# Patient Record
Sex: FEMALE | Race: WHITE | Employment: FULL TIME | URBAN - METROPOLITAN AREA
[De-identification: names, ages, dates, MRNs, and addresses within clinical notes are randomized per-mention and may not be internally consistent; named-entity substitution may affect disease eponyms.]

---

## 2018-10-17 LAB
ALBUMIN SERPL-MCNC: 4.6 G/DL
ALP BLD-CCNC: 46 U/L
ALT SERPL-CCNC: 11 U/L
ANION GAP SERPL CALCULATED.3IONS-SCNC: NORMAL MMOL/L
AST SERPL-CCNC: 12 U/L
BILIRUB SERPL-MCNC: 0.5 MG/DL (ref 0.1–1.4)
BUN BLDV-MCNC: NORMAL MG/DL
CALCIUM SERPL-MCNC: 9.2 MG/DL
CHLORIDE BLD-SCNC: 105 MMOL/L
CHOLESTEROL, TOTAL: 186 MG/DL
CHOLESTEROL/HDL RATIO: NORMAL
CO2: 25 MMOL/L
CREAT SERPL-MCNC: 0.73 MG/DL
GFR CALCULATED: NORMAL
GLUCOSE BLD-MCNC: 89 MG/DL
HDLC SERPL-MCNC: 56 MG/DL (ref 35–70)
LDL CHOLESTEROL CALCULATED: NORMAL MG/DL (ref 0–160)
LDL CHOLESTEROL DIRECT: 114 MG/DL
POTASSIUM SERPL-SCNC: 4.3 MMOL/L
SODIUM BLD-SCNC: 140 MMOL/L
TOTAL PROTEIN: 6.8
TRIGL SERPL-MCNC: 70 MG/DL
VLDLC SERPL CALC-MCNC: NORMAL MG/DL

## 2019-01-08 ENCOUNTER — OFFICE VISIT (OUTPATIENT)
Dept: FAMILY MEDICINE CLINIC | Age: 39
End: 2019-01-08
Payer: COMMERCIAL

## 2019-01-08 VITALS
TEMPERATURE: 96.1 F | HEART RATE: 68 BPM | OXYGEN SATURATION: 98 % | RESPIRATION RATE: 16 BRPM | HEIGHT: 66 IN | DIASTOLIC BLOOD PRESSURE: 58 MMHG | SYSTOLIC BLOOD PRESSURE: 102 MMHG | BODY MASS INDEX: 38.89 KG/M2 | WEIGHT: 242 LBS

## 2019-01-08 DIAGNOSIS — B00.1 COLD SORE: ICD-10-CM

## 2019-01-08 DIAGNOSIS — G47.9 SLEEP DISTURBANCE: ICD-10-CM

## 2019-01-08 DIAGNOSIS — L30.9 DERMATITIS: ICD-10-CM

## 2019-01-08 DIAGNOSIS — R06.83 SNORING: ICD-10-CM

## 2019-01-08 DIAGNOSIS — R25.1 TREMORS OF NERVOUS SYSTEM: ICD-10-CM

## 2019-01-08 DIAGNOSIS — E03.9 ACQUIRED HYPOTHYROIDISM: Primary | ICD-10-CM

## 2019-01-08 DIAGNOSIS — M62.838 MUSCLE SPASM: ICD-10-CM

## 2019-01-08 PROCEDURE — 99203 OFFICE O/P NEW LOW 30 MIN: CPT | Performed by: INTERNAL MEDICINE

## 2019-01-08 RX ORDER — VALACYCLOVIR HYDROCHLORIDE 1 G/1
TABLET, FILM COATED ORAL
COMMUNITY
Start: 2018-10-12 | End: 2019-09-17 | Stop reason: SDUPTHER

## 2019-01-08 RX ORDER — LEVOTHYROXINE SODIUM 88 MCG
TABLET ORAL
COMMUNITY
Start: 2018-12-18

## 2019-01-08 RX ORDER — TRIAMCINOLONE ACETONIDE 1 MG/ML
LOTION TOPICAL 3 TIMES DAILY
COMMUNITY

## 2019-01-08 RX ORDER — CYCLOBENZAPRINE HCL 5 MG
TABLET ORAL
COMMUNITY
Start: 2018-10-17 | End: 2019-09-17 | Stop reason: SDUPTHER

## 2019-01-08 ASSESSMENT — ENCOUNTER SYMPTOMS
COUGH: 0
STRIDOR: 0
BACK PAIN: 0
SHORTNESS OF BREATH: 0
CHEST TIGHTNESS: 0
CONSTIPATION: 0
DIARRHEA: 0
CHOKING: 0
ABDOMINAL PAIN: 0
WHEEZING: 0

## 2019-01-08 ASSESSMENT — PATIENT HEALTH QUESTIONNAIRE - PHQ9
SUM OF ALL RESPONSES TO PHQ9 QUESTIONS 1 & 2: 0
SUM OF ALL RESPONSES TO PHQ QUESTIONS 1-9: 0
SUM OF ALL RESPONSES TO PHQ QUESTIONS 1-9: 0
2. FEELING DOWN, DEPRESSED OR HOPELESS: 0
1. LITTLE INTEREST OR PLEASURE IN DOING THINGS: 0

## 2019-09-17 RX ORDER — VALACYCLOVIR HYDROCHLORIDE 1 G/1
1000 TABLET, FILM COATED ORAL DAILY
Qty: 30 TABLET | Refills: 5 | Status: SHIPPED | OUTPATIENT
Start: 2019-09-17 | End: 2020-10-21 | Stop reason: SDUPTHER

## 2019-09-17 RX ORDER — CYCLOBENZAPRINE HCL 5 MG
5 TABLET ORAL 3 TIMES DAILY PRN
Qty: 30 TABLET | Refills: 5 | Status: SHIPPED | OUTPATIENT
Start: 2019-09-17 | End: 2020-05-11 | Stop reason: ALTCHOICE

## 2020-05-11 ENCOUNTER — OFFICE VISIT (OUTPATIENT)
Dept: FAMILY MEDICINE CLINIC | Age: 40
End: 2020-05-11
Payer: COMMERCIAL

## 2020-05-11 VITALS
WEIGHT: 263 LBS | RESPIRATION RATE: 16 BRPM | HEIGHT: 67 IN | TEMPERATURE: 96.5 F | BODY MASS INDEX: 41.28 KG/M2 | HEART RATE: 82 BPM | SYSTOLIC BLOOD PRESSURE: 102 MMHG | OXYGEN SATURATION: 97 % | DIASTOLIC BLOOD PRESSURE: 66 MMHG

## 2020-05-11 PROCEDURE — 99213 OFFICE O/P EST LOW 20 MIN: CPT | Performed by: INTERNAL MEDICINE

## 2020-05-11 PROCEDURE — 99396 PREV VISIT EST AGE 40-64: CPT | Performed by: INTERNAL MEDICINE

## 2020-05-11 RX ORDER — DEXAMETHASONE 4 MG/1
4 TABLET ORAL ONCE
Qty: 1 TABLET | Refills: 0 | Status: SHIPPED | OUTPATIENT
Start: 2020-05-11 | End: 2020-05-11

## 2020-05-11 ASSESSMENT — ENCOUNTER SYMPTOMS
ABDOMINAL PAIN: 0
DIARRHEA: 0
BOWEL INCONTINENCE: 0
COUGH: 0
VOMITING: 0
BLOOD IN STOOL: 0
CHOKING: 0
BACK PAIN: 0
CONSTIPATION: 0
WHEEZING: 0
ANAL BLEEDING: 0
CHEST TIGHTNESS: 0
STRIDOR: 0
NAUSEA: 0
SHORTNESS OF BREATH: 0

## 2020-05-11 ASSESSMENT — PATIENT HEALTH QUESTIONNAIRE - PHQ9
1. LITTLE INTEREST OR PLEASURE IN DOING THINGS: 0
SUM OF ALL RESPONSES TO PHQ QUESTIONS 1-9: 0
SUM OF ALL RESPONSES TO PHQ QUESTIONS 1-9: 0
SUM OF ALL RESPONSES TO PHQ9 QUESTIONS 1 & 2: 0
2. FEELING DOWN, DEPRESSED OR HOPELESS: 0

## 2020-05-11 NOTE — PROGRESS NOTES
Visit Information    Have you changed or started any medications since your last visit including any over-the-counter medicines, vitamins, or herbal medicines? no   Are you having any side effects from any of your medications? -  no  Have you stopped taking any of your medications? Is so, why? -  no    Have you seen any other physician or provider since your last visit? No  Have you had any other diagnostic tests since your last visit? No  Have you been seen in the emergency room and/or had an admission to a hospital since we last saw you? No  Have you had your routine dental cleaning in the past 6 months? no    Have you activated your [employer] account? If not, what are your barriers?  Yes     Patient Care Team:  Hay Mercado DO as PCP - General (Family Medicine)  Hay Mercado DO as PCP - [facility] Provider    Medical History Review  Past Medical, Family, and Social History reviewed and does contribute to the patient presenting condition    Health Maintenance   Topic Date Due    HIV screen  09/01/1994    Cervical cancer screen  09/01/2000    Flu vaccine (Season Ended) 09/01/2020    DTaP/Tdap/Td vaccine (2 - Td) 01/08/2023    Lipid screen  10/17/2023    Hepatitis A vaccine  Aged Out    Hepatitis B vaccine  Aged Out    Hib vaccine  Aged Out    Meningococcal (ACWY) vaccine  Aged Out    Pneumococcal 0-64 years Vaccine  Aged Out

## 2020-05-19 ENCOUNTER — TELEPHONE (OUTPATIENT)
Dept: FAMILY MEDICINE CLINIC | Age: 40
End: 2020-05-19

## 2020-05-21 ENCOUNTER — HOSPITAL ENCOUNTER (OUTPATIENT)
Age: 40
Setting detail: SPECIMEN
Discharge: HOME | End: 2020-05-21
Payer: COMMERCIAL

## 2020-05-21 ENCOUNTER — HOSPITAL ENCOUNTER (OUTPATIENT)
Age: 40
Discharge: HOME | End: 2020-05-21
Payer: COMMERCIAL

## 2020-05-21 LAB
ABSOLUTE EOS #: <0.03 K/UL (ref 0–0.44)
ABSOLUTE IMMATURE GRANULOCYTE: 0.04 K/UL (ref 0–0.3)
ABSOLUTE LYMPH #: 0.85 K/UL (ref 1.1–3.7)
ABSOLUTE MONO #: 0.14 K/UL (ref 0.1–1.2)
ALBUMIN SERPL-MCNC: 4.6 G/DL (ref 3.5–5.2)
ALBUMIN/GLOBULIN RATIO: 1.6 (ref 1–2.5)
ALP BLD-CCNC: 75 U/L (ref 35–104)
ALT SERPL-CCNC: 25 U/L (ref 5–33)
ANION GAP SERPL CALCULATED.3IONS-SCNC: 17 MMOL/L (ref 9–17)
AST SERPL-CCNC: 17 U/L
BASOPHILS # BLD: 0 % (ref 0–2)
BASOPHILS ABSOLUTE: <0.03 K/UL (ref 0–0.2)
BILIRUB SERPL-MCNC: 0.37 MG/DL (ref 0.3–1.2)
BUN BLDV-MCNC: 13 MG/DL (ref 6–20)
BUN/CREAT BLD: ABNORMAL (ref 9–20)
CALCIUM SERPL-MCNC: 9.9 MG/DL (ref 8.6–10.4)
CHLORIDE BLD-SCNC: 99 MMOL/L (ref 98–107)
CO2: 20 MMOL/L (ref 20–31)
CORTISOL COLLECTION INFO: ABNORMAL
CORTISOL: 0.4 UG/DL (ref 2.7–18.4)
CREAT SERPL-MCNC: 0.53 MG/DL (ref 0.5–0.9)
DIFFERENTIAL TYPE: ABNORMAL
EOSINOPHILS RELATIVE PERCENT: 0 % (ref 1–4)
GFR AFRICAN AMERICAN: >60 ML/MIN
GFR NON-AFRICAN AMERICAN: >60 ML/MIN
GFR SERPL CREATININE-BSD FRML MDRD: ABNORMAL ML/MIN/{1.73_M2}
GFR SERPL CREATININE-BSD FRML MDRD: ABNORMAL ML/MIN/{1.73_M2}
GLUCOSE BLD-MCNC: 112 MG/DL (ref 70–99)
HCT VFR BLD CALC: 46.9 % (ref 36.3–47.1)
HEMOGLOBIN: 15.7 G/DL (ref 11.9–15.1)
IMMATURE GRANULOCYTES: 0 %
LYMPHOCYTES # BLD: 9 % (ref 24–43)
MCH RBC QN AUTO: 28 PG (ref 25.2–33.5)
MCHC RBC AUTO-ENTMCNC: 33.5 G/DL (ref 28.4–34.8)
MCV RBC AUTO: 83.6 FL (ref 82.6–102.9)
MONOCYTES # BLD: 2 % (ref 3–12)
NRBC AUTOMATED: 0 PER 100 WBC
PDW BLD-RTO: 13 % (ref 11.8–14.4)
PLATELET # BLD: 271 K/UL (ref 138–453)
PLATELET ESTIMATE: ABNORMAL
PMV BLD AUTO: 9.9 FL (ref 8.1–13.5)
POTASSIUM SERPL-SCNC: 4.3 MMOL/L (ref 3.7–5.3)
RBC # BLD: 5.61 M/UL (ref 3.95–5.11)
RBC # BLD: ABNORMAL 10*6/UL
SEG NEUTROPHILS: 89 % (ref 36–65)
SEGMENTED NEUTROPHILS ABSOLUTE COUNT: 8.22 K/UL (ref 1.5–8.1)
SODIUM BLD-SCNC: 136 MMOL/L (ref 135–144)
THYROGLOBULIN AB: <20 IU/ML (ref 0–40)
THYROID PEROXIDASE (TPO) AB: 196 IU/ML (ref 0–35)
TOTAL PROTEIN: 7.5 G/DL (ref 6.4–8.3)
TSH SERPL DL<=0.05 MIU/L-ACNC: 1.86 MIU/L (ref 0.3–5)
WBC # BLD: 9.3 K/UL (ref 3.5–11.3)
WBC # BLD: ABNORMAL 10*3/UL

## 2020-05-21 PROCEDURE — 82533 TOTAL CORTISOL: CPT

## 2020-05-21 PROCEDURE — 82024 ASSAY OF ACTH: CPT

## 2020-05-21 PROCEDURE — 36415 COLL VENOUS BLD VENIPUNCTURE: CPT

## 2020-05-22 LAB — ADRENOCORTICOTROPIC HORMONE: <5 PG/ML (ref 6–58)

## 2020-06-01 ENCOUNTER — HOSPITAL ENCOUNTER (OUTPATIENT)
Dept: SLEEP CENTER | Age: 40
End: 2020-06-01
Payer: COMMERCIAL

## 2020-06-03 ENCOUNTER — HOSPITAL ENCOUNTER (OUTPATIENT)
Age: 40
Discharge: HOME | End: 2020-06-03
Payer: COMMERCIAL

## 2020-06-03 LAB
ADRENOCORTICOTROPIC HORMONE: 16 PG/ML (ref 6–58)
ALBUMIN SERPL-MCNC: 3.9 G/DL (ref 3.5–5.2)
ALBUMIN/GLOBULIN RATIO: 1.5 (ref 1–2.5)
ALP BLD-CCNC: 63 U/L (ref 35–104)
ALT SERPL-CCNC: 18 U/L (ref 5–33)
ANION GAP SERPL CALCULATED.3IONS-SCNC: 13 MMOL/L (ref 9–17)
AST SERPL-CCNC: 16 U/L
BILIRUB SERPL-MCNC: 0.41 MG/DL (ref 0.3–1.2)
BUN BLDV-MCNC: 13 MG/DL (ref 6–20)
BUN/CREAT BLD: ABNORMAL (ref 9–20)
CALCIUM SERPL-MCNC: 9.2 MG/DL (ref 8.6–10.4)
CHLORIDE BLD-SCNC: 103 MMOL/L (ref 98–107)
CO2: 19 MMOL/L (ref 20–31)
CREAT SERPL-MCNC: 0.6 MG/DL (ref 0.5–0.9)
DHEAS (DHEA SULFATE): 85.2 UG/DL (ref 32–240)
GFR AFRICAN AMERICAN: >60 ML/MIN
GFR NON-AFRICAN AMERICAN: >60 ML/MIN
GFR SERPL CREATININE-BSD FRML MDRD: ABNORMAL ML/MIN/{1.73_M2}
GFR SERPL CREATININE-BSD FRML MDRD: ABNORMAL ML/MIN/{1.73_M2}
GLUCOSE FASTING: 94 MG/DL (ref 70–99)
INSULIN COMMENT: NORMAL
INSULIN REFERENCE RANGE:: NORMAL
INSULIN: 23 MU/L
IRON SATURATION: 27 % (ref 20–55)
IRON: 66 UG/DL (ref 37–145)
POTASSIUM SERPL-SCNC: 4.2 MMOL/L (ref 3.7–5.3)
SODIUM BLD-SCNC: 135 MMOL/L (ref 135–144)
T3 FREE: 2.92 PG/ML (ref 2.02–4.43)
TESTOSTERONE TOTAL: 11 NG/DL (ref 20–70)
THYROXINE, FREE: 1.13 NG/DL (ref 0.93–1.7)
TOTAL IRON BINDING CAPACITY: 248 UG/DL (ref 250–450)
TOTAL PROTEIN: 6.5 G/DL (ref 6.4–8.3)
TSH SERPL DL<=0.05 MIU/L-ACNC: 4.24 MIU/L (ref 0.3–5)
UNSATURATED IRON BINDING CAPACITY: 182 UG/DL (ref 112–347)
VITAMIN D 25-HYDROXY: 19 NG/ML (ref 30–100)

## 2020-06-03 PROCEDURE — 82627 DEHYDROEPIANDROSTERONE: CPT

## 2020-06-03 PROCEDURE — 84244 ASSAY OF RENIN: CPT

## 2020-06-03 PROCEDURE — 84443 ASSAY THYROID STIM HORMONE: CPT

## 2020-06-03 PROCEDURE — 83550 IRON BINDING TEST: CPT

## 2020-06-03 PROCEDURE — 82306 VITAMIN D 25 HYDROXY: CPT

## 2020-06-03 PROCEDURE — 36415 COLL VENOUS BLD VENIPUNCTURE: CPT

## 2020-06-03 PROCEDURE — 82024 ASSAY OF ACTH: CPT

## 2020-06-03 PROCEDURE — 82088 ASSAY OF ALDOSTERONE: CPT

## 2020-06-03 PROCEDURE — 83525 ASSAY OF INSULIN: CPT

## 2020-06-03 PROCEDURE — 83498 ASY HYDROXYPROGESTERONE 17-D: CPT

## 2020-06-03 PROCEDURE — 84403 ASSAY OF TOTAL TESTOSTERONE: CPT

## 2020-06-03 PROCEDURE — 80053 COMPREHEN METABOLIC PANEL: CPT

## 2020-06-03 PROCEDURE — 84439 ASSAY OF FREE THYROXINE: CPT

## 2020-06-03 PROCEDURE — 84481 FREE ASSAY (FT-3): CPT

## 2020-06-03 PROCEDURE — 83540 ASSAY OF IRON: CPT

## 2020-06-05 LAB
ALDOSTERONE COMMENT: NORMAL
ALDOSTERONE: 21.2 NG/DL

## 2020-06-06 LAB
17 OH PROGESTERONE: 244.82 NG/DL
RENIN ACTIVITY: 2.8 NG/ML/HR
RENIN COMMENT: NORMAL

## 2020-06-09 ENCOUNTER — HOSPITAL ENCOUNTER (OUTPATIENT)
Dept: SLEEP CENTER | Age: 40
Discharge: HOME | End: 2020-06-11
Payer: COMMERCIAL

## 2020-06-09 PROCEDURE — G0399 HOME SLEEP TEST/TYPE 3 PORTA: HCPCS

## 2020-06-13 ENCOUNTER — HOSPITAL ENCOUNTER (OUTPATIENT)
Age: 40
Setting detail: SPECIMEN
Discharge: HOME | End: 2020-06-13
Payer: COMMERCIAL

## 2020-06-13 PROCEDURE — 82533 TOTAL CORTISOL: CPT

## 2020-06-19 LAB — CORTISOL SALIVARY: 0.05 UG/DL

## 2020-06-28 LAB — STATUS: NORMAL

## 2020-07-10 ENCOUNTER — HOSPITAL ENCOUNTER (OUTPATIENT)
Age: 40
Discharge: HOME | End: 2020-07-10
Payer: COMMERCIAL

## 2020-07-10 LAB — VITAMIN D 25-HYDROXY: 34.1 NG/ML (ref 30–100)

## 2020-07-10 PROCEDURE — 36415 COLL VENOUS BLD VENIPUNCTURE: CPT

## 2020-07-10 PROCEDURE — 82306 VITAMIN D 25 HYDROXY: CPT

## 2020-08-07 PROBLEM — R63.5 ABNORMAL WEIGHT GAIN: Status: ACTIVE | Noted: 2020-08-07

## 2020-08-07 PROBLEM — G93.32 CHRONIC FATIGUE SYNDROME: Status: ACTIVE | Noted: 2020-08-07

## 2020-08-14 ENCOUNTER — HOSPITAL ENCOUNTER (OUTPATIENT)
Facility: MEDICAL CENTER | Age: 40
End: 2020-08-14
Payer: COMMERCIAL

## 2020-08-19 ENCOUNTER — HOSPITAL ENCOUNTER (OUTPATIENT)
Dept: INFUSION THERAPY | Facility: MEDICAL CENTER | Age: 40
Discharge: HOME | End: 2020-08-19
Payer: COMMERCIAL

## 2020-08-19 VITALS
DIASTOLIC BLOOD PRESSURE: 83 MMHG | TEMPERATURE: 98.2 F | RESPIRATION RATE: 16 BRPM | SYSTOLIC BLOOD PRESSURE: 128 MMHG | HEART RATE: 79 BPM

## 2020-08-19 DIAGNOSIS — G93.32 CHRONIC FATIGUE SYNDROME: Primary | ICD-10-CM

## 2020-08-19 DIAGNOSIS — R63.5 ABNORMAL WEIGHT GAIN: ICD-10-CM

## 2020-08-19 LAB
CORTISOL COLLECTION INFO: NORMAL
CORTISOL: 16.3 UG/DL (ref 2.7–18.4)

## 2020-08-19 PROCEDURE — 96375 TX/PRO/DX INJ NEW DRUG ADDON: CPT

## 2020-08-19 PROCEDURE — 82533 TOTAL CORTISOL: CPT

## 2020-08-19 PROCEDURE — 6360000002 HC RX W HCPCS: Performed by: INTERNAL MEDICINE

## 2020-08-19 PROCEDURE — 2580000003 HC RX 258: Performed by: INTERNAL MEDICINE

## 2020-08-19 PROCEDURE — 96374 THER/PROPH/DIAG INJ IV PUSH: CPT

## 2020-08-19 RX ORDER — ERGOCALCIFEROL 1.25 MG/1
50000 CAPSULE ORAL
COMMUNITY
End: 2022-03-04

## 2020-08-19 RX ADMIN — COSYNTROPIN 1 MCG: 0.25 INJECTION, POWDER, LYOPHILIZED, FOR SOLUTION INTRAMUSCULAR; INTRAVENOUS at 13:30

## 2020-08-19 NOTE — PROGRESS NOTES
Patient arrive ambulatory for Cortrosyn stimulation test.  Peripheral IV established per policy. Vitals as charted. Patient medicated; call to phlebotomist to arrive for blood draw at 1400. Phlebotomy collected blood specimen. Intact IV catheter removed with pressure dressing applied. Patient ambulate off unit per self at discharge.

## 2020-08-24 ENCOUNTER — HOSPITAL ENCOUNTER (OUTPATIENT)
Age: 40
Discharge: HOME | End: 2020-08-24
Payer: COMMERCIAL

## 2020-08-24 LAB — VITAMIN D 25-HYDROXY: 38.8 NG/ML (ref 30–100)

## 2020-08-24 PROCEDURE — 82306 VITAMIN D 25 HYDROXY: CPT

## 2020-08-24 PROCEDURE — 36415 COLL VENOUS BLD VENIPUNCTURE: CPT

## 2020-10-21 RX ORDER — VALACYCLOVIR HYDROCHLORIDE 1 G/1
1000 TABLET, FILM COATED ORAL DAILY
Qty: 30 TABLET | Refills: 5 | Status: SHIPPED | OUTPATIENT
Start: 2020-10-21 | End: 2022-03-02 | Stop reason: SDUPTHER

## 2020-11-07 ENCOUNTER — HOSPITAL ENCOUNTER (OUTPATIENT)
Age: 40
Discharge: HOME | End: 2020-11-07
Payer: COMMERCIAL

## 2020-11-07 LAB
THYROXINE, FREE: 1.14 NG/DL (ref 0.93–1.7)
TSH SERPL DL<=0.05 MIU/L-ACNC: 5.92 MIU/L (ref 0.3–5)
VITAMIN D 25-HYDROXY: 49.8 NG/ML (ref 30–100)

## 2020-11-07 PROCEDURE — 36415 COLL VENOUS BLD VENIPUNCTURE: CPT

## 2020-11-07 PROCEDURE — 84443 ASSAY THYROID STIM HORMONE: CPT

## 2020-11-07 PROCEDURE — 84439 ASSAY OF FREE THYROXINE: CPT

## 2020-11-07 PROCEDURE — 82306 VITAMIN D 25 HYDROXY: CPT

## 2020-12-10 ENCOUNTER — HOSPITAL ENCOUNTER (OUTPATIENT)
Age: 40
Discharge: HOME | End: 2020-12-10
Payer: COMMERCIAL

## 2020-12-10 LAB
T3 FREE: 3.32 PG/ML (ref 2.02–4.43)
THYROXINE, FREE: 1.27 NG/DL (ref 0.93–1.7)
TSH SERPL DL<=0.05 MIU/L-ACNC: 0.75 MIU/L (ref 0.3–5)

## 2020-12-10 PROCEDURE — 36415 COLL VENOUS BLD VENIPUNCTURE: CPT

## 2020-12-10 PROCEDURE — 84481 FREE ASSAY (FT-3): CPT

## 2020-12-10 PROCEDURE — 84439 ASSAY OF FREE THYROXINE: CPT

## 2020-12-10 PROCEDURE — 84443 ASSAY THYROID STIM HORMONE: CPT

## 2021-04-19 ENCOUNTER — HOSPITAL ENCOUNTER (OUTPATIENT)
Age: 41
Discharge: HOME | End: 2021-04-19
Payer: COMMERCIAL

## 2021-04-19 LAB
T3 FREE: 3.39 PG/ML (ref 2.02–4.43)
THYROXINE, FREE: 1.03 NG/DL (ref 0.93–1.7)
TSH SERPL DL<=0.05 MIU/L-ACNC: 2.85 MIU/L (ref 0.3–5)
VITAMIN D 25-HYDROXY: 52.2 NG/ML (ref 30–100)

## 2021-04-19 PROCEDURE — 84439 ASSAY OF FREE THYROXINE: CPT

## 2021-04-19 PROCEDURE — 84481 FREE ASSAY (FT-3): CPT

## 2021-04-19 PROCEDURE — 36415 COLL VENOUS BLD VENIPUNCTURE: CPT

## 2021-04-19 PROCEDURE — 82306 VITAMIN D 25 HYDROXY: CPT

## 2021-04-19 PROCEDURE — 84443 ASSAY THYROID STIM HORMONE: CPT

## 2021-12-07 ENCOUNTER — HOSPITAL ENCOUNTER (OUTPATIENT)
Age: 41
Discharge: HOME | End: 2021-12-07
Payer: COMMERCIAL

## 2021-12-07 LAB
T3 FREE: 3 PG/ML (ref 2.02–4.43)
THYROXINE, FREE: 1.09 NG/DL (ref 0.93–1.7)
TSH SERPL DL<=0.05 MIU/L-ACNC: 1.39 MIU/L (ref 0.3–5)
VITAMIN D 25-HYDROXY: 86.2 NG/ML (ref 30–100)

## 2021-12-07 PROCEDURE — 84481 FREE ASSAY (FT-3): CPT

## 2021-12-07 PROCEDURE — 84443 ASSAY THYROID STIM HORMONE: CPT

## 2021-12-07 PROCEDURE — 82306 VITAMIN D 25 HYDROXY: CPT

## 2021-12-07 PROCEDURE — 36415 COLL VENOUS BLD VENIPUNCTURE: CPT

## 2021-12-07 PROCEDURE — 84439 ASSAY OF FREE THYROXINE: CPT

## 2022-03-02 ENCOUNTER — TELEPHONE (OUTPATIENT)
Dept: FAMILY MEDICINE CLINIC | Age: 42
End: 2022-03-02

## 2022-03-02 RX ORDER — VALACYCLOVIR HYDROCHLORIDE 1 G/1
1000 TABLET, FILM COATED ORAL 2 TIMES DAILY
Qty: 14 TABLET | Refills: 0 | Status: SHIPPED | OUTPATIENT
Start: 2022-03-02 | End: 2022-03-09

## 2022-03-02 NOTE — TELEPHONE ENCOUNTER
----- Message from Marsha Wren sent at 3/2/2022 11:14 AM EST -----  Subject: Refill Request    QUESTIONS  Name of Medication? valACYclovir (VALTREX) 1 g tablet  Patient-reported dosage and instructions? 2 times a day during outbreak  How many days do you have left? 0  [facility]? [id number]  Pharmacy phone number (if available)? [phone]  ---------------------------------------------------------------------------  --------------  Kumar YIP  What is the best way for the office to contact you? OK to leave message on   voicemail  Preferred Call Back Phone Number?  [phone]

## 2022-03-04 ENCOUNTER — TELEMEDICINE (OUTPATIENT)
Dept: FAMILY MEDICINE CLINIC | Age: 42
End: 2022-03-04
Payer: COMMERCIAL

## 2022-03-04 DIAGNOSIS — M62.830 BACK MUSCLE SPASM: ICD-10-CM

## 2022-03-04 DIAGNOSIS — Z76.89 ESTABLISHING CARE WITH NEW DOCTOR, ENCOUNTER FOR: ICD-10-CM

## 2022-03-04 DIAGNOSIS — B00.1 COLD SORE: Primary | ICD-10-CM

## 2022-03-04 DIAGNOSIS — E66.01 MORBID OBESITY WITH BMI OF 40.0-44.9, ADULT (HCC): ICD-10-CM

## 2022-03-04 PROCEDURE — 98967 PH1 ASSMT&MGMT NQHP 11-20: CPT | Performed by: NURSE PRACTITIONER

## 2022-03-04 PROCEDURE — G8417 CALC BMI ABV UP PARAM F/U: HCPCS | Performed by: NURSE PRACTITIONER

## 2022-03-04 RX ORDER — CYCLOBENZAPRINE HCL 5 MG
5 TABLET ORAL 2 TIMES DAILY PRN
Qty: 60 TABLET | Refills: 0 | Status: SHIPPED | OUTPATIENT
Start: 2022-03-04

## 2022-03-04 ASSESSMENT — PATIENT HEALTH QUESTIONNAIRE - PHQ9
4. FEELING TIRED OR HAVING LITTLE ENERGY: 0
SUM OF ALL RESPONSES TO PHQ QUESTIONS 1-9: 0
9. THOUGHTS THAT YOU WOULD BE BETTER OFF DEAD, OR OF HURTING YOURSELF: 0
SUM OF ALL RESPONSES TO PHQ QUESTIONS 1-9: 0
SUM OF ALL RESPONSES TO PHQ QUESTIONS 1-9: 0
8. MOVING OR SPEAKING SO SLOWLY THAT OTHER PEOPLE COULD HAVE NOTICED. OR THE OPPOSITE, BEING SO FIGETY OR RESTLESS THAT YOU HAVE BEEN MOVING AROUND A LOT MORE THAN USUAL: 0
1. LITTLE INTEREST OR PLEASURE IN DOING THINGS: 0
5. POOR APPETITE OR OVEREATING: 0
SUM OF ALL RESPONSES TO PHQ QUESTIONS 1-9: 0
10. IF YOU CHECKED OFF ANY PROBLEMS, HOW DIFFICULT HAVE THESE PROBLEMS MADE IT FOR YOU TO DO YOUR WORK, TAKE CARE OF THINGS AT HOME, OR GET ALONG WITH OTHER PEOPLE: 0
7. TROUBLE CONCENTRATING ON THINGS, SUCH AS READING THE NEWSPAPER OR WATCHING TELEVISION: 0
3. TROUBLE FALLING OR STAYING ASLEEP: 0
2. FEELING DOWN, DEPRESSED OR HOPELESS: 0
SUM OF ALL RESPONSES TO PHQ9 QUESTIONS 1 & 2: 0
6. FEELING BAD ABOUT YOURSELF - OR THAT YOU ARE A FAILURE OR HAVE LET YOURSELF OR YOUR FAMILY DOWN: 0

## 2022-03-04 NOTE — PROGRESS NOTES
Visit Information    Have you changed or started any medications since your last visit including any over-the-counter medicines, vitamins, or herbal medicines? no   Have you stopped taking any of your medications? Is so, why? -  no  Are you having any side effects from any of your medications? - no    Have you seen any other physician or provider since your last visit?  no   Have you had any other diagnostic tests since your last visit?  no   Have you been seen in the emergency room and/or had an admission in a hospital since we last saw you?  no   Have you had your routine dental cleaning in the past 6 months?  no     Do you have an active MyChart account? If no, what is the barrier?   Yes    Patient Care Team:  Amada Kocher, DO as PCP - General (Family Medicine)    Medical History Review  Past Medical, Family, and Social History reviewed and  contribute to the patient presenting condition    Health Maintenance   Topic Date Due    Hepatitis C screen  Never done    COVID-19 Vaccine (1) Never done    Depression Screen  Never done    Flu vaccine (1) Never done    Cervical cancer screen  01/24/2022    DTaP/Tdap/Td vaccine (2 - Td or Tdap) 01/08/2023    Lipid screen  10/17/2023    Hepatitis A vaccine  Aged Out    Hepatitis B vaccine  Aged Out    Hib vaccine  Aged Out    Meningococcal (ACWY) vaccine  Aged Out    Pneumococcal 0-64 years Vaccine  Aged Out    HIV screen  Discontinued

## 2022-03-04 NOTE — PROGRESS NOTES
Lisa Plascencia is a 42 y.o. female evaluated via telephone on 3/4/2022. Consent:  She and/or health care decision maker is aware that that she may receive a bill for this telephone service, which includes applicable co-pays, depending on her insurance coverage, and has provided verbal consent to proceed. Documentation:  I communicated with the patient and/or health care decision maker about:  Patient calling in today to establish care. States last PCP left last year and has not seen anyone since. States she recently was prescribed Valtrex for cold sores which she gets intermittently and states this has been working to help resolve this. States she continues with endocrinology for thyroid issues and has routine blood work. States she is stable on her current medications with no side effects. States she is wondering what she can do for weight loss as she does really well on diets and then something will derail her and she will gain the weight back. She would like to start with a dietitian if possible. She does have a family history of obesity. Denies chest pain, shortness of breath, dizziness, swelling. She has had blood work checked routinely for hormone issues in the past.  She would like a refill on Flexeril that she takes very sparingly for back spasms. She is established with gynecologist and will call for an appointment for Pap smear. Details of this discussion including any medical advice provided:   Patient chart updated. Continue rest of Valtrex until gone. Continue with Dr. Wilbur Campos and same medication for thyroid. Refill given for Flexeril for as needed use for back spasms. Continue with gynecology and follow-up for Pap smear when due. They are also managing her mammograms. Declines new blood work at this time and states thyroid was recently in balance. Referral placed for dietitian for weight management options. She does not want surgery at this time.   Regular exercise of 150 minutes/week and a low-carb low sugar high-fiber high-protein diet advised for now. Patient agrees to plan and will call with any other concerns or needs. Diagnosis Orders   1. Cold sore     2. Establishing care with new doctor, encounter for     3. Back muscle spasm  cyclobenzaprine (FLEXERIL) 5 MG tablet   4. Morbid obesity with BMI of 40.0-44.9, adult ([address]  VT CALC BMI ABV UP NATA F/U    [facility]       I affirm this is a Patient Initiated Episode with a Patient who has not had a related appointment within my department in the past 7 days or scheduled within the next 24 hours. Patient identification was verified at the start of the visit: Yes    Total Time: minutes: 11-20 minutes    Ean Kessler was evaluated through a synchronous (real-time) audio encounter. The patient was located at home in a state where the provider was licensed to provide care.     Note: not billable if this call serves to triage the patient into an appointment for the relevant concern      DOMO Licona - CNP

## 2022-04-11 ENCOUNTER — HOSPITAL ENCOUNTER (OUTPATIENT)
Age: 42
Discharge: HOME | End: 2022-04-11
Payer: COMMERCIAL

## 2022-04-11 LAB
T3 FREE: 3.76 PG/ML (ref 2.02–4.43)
THYROXINE, FREE: 1.24 NG/DL (ref 0.93–1.7)
TSH SERPL DL<=0.05 MIU/L-ACNC: 3.57 UIU/ML (ref 0.3–5)
VITAMIN D 25-HYDROXY: 44.4 NG/ML

## 2022-04-11 PROCEDURE — 84443 ASSAY THYROID STIM HORMONE: CPT

## 2022-04-11 PROCEDURE — 84481 FREE ASSAY (FT-3): CPT

## 2022-04-11 PROCEDURE — 84439 ASSAY OF FREE THYROXINE: CPT

## 2022-04-11 PROCEDURE — 82306 VITAMIN D 25 HYDROXY: CPT

## 2022-04-11 PROCEDURE — 36415 COLL VENOUS BLD VENIPUNCTURE: CPT

## 2022-09-09 RX ORDER — VALACYCLOVIR HYDROCHLORIDE 1 G/1
1000 TABLET, FILM COATED ORAL 2 TIMES DAILY
OUTPATIENT
Start: 2022-09-09

## 2022-09-09 RX ORDER — VALACYCLOVIR HYDROCHLORIDE 1 G/1
1000 TABLET, FILM COATED ORAL 2 TIMES DAILY
COMMUNITY
End: 2022-09-13 | Stop reason: SDUPTHER

## 2022-09-13 RX ORDER — VALACYCLOVIR HYDROCHLORIDE 1 G/1
1000 TABLET, FILM COATED ORAL 2 TIMES DAILY
Qty: 4 TABLET | Refills: 5 | Status: SHIPPED | OUTPATIENT
Start: 2022-09-13 | End: 2022-09-15

## 2022-10-13 ENCOUNTER — HOSPITAL ENCOUNTER (OUTPATIENT)
Age: 42
Discharge: HOME | End: 2022-10-13
Payer: COMMERCIAL

## 2022-10-13 LAB
T3 FREE: 2.94 PG/ML (ref 2.02–4.43)
THYROXINE, FREE: 1 NG/DL (ref 0.93–1.7)
TSH SERPL DL<=0.05 MIU/L-ACNC: 3.48 UIU/ML (ref 0.3–5)
VITAMIN D 25-HYDROXY: 34.6 NG/ML

## 2022-10-13 PROCEDURE — 84439 ASSAY OF FREE THYROXINE: CPT

## 2022-10-13 PROCEDURE — 36415 COLL VENOUS BLD VENIPUNCTURE: CPT

## 2022-10-13 PROCEDURE — 82306 VITAMIN D 25 HYDROXY: CPT

## 2022-10-13 PROCEDURE — 84443 ASSAY THYROID STIM HORMONE: CPT

## 2022-10-13 PROCEDURE — 84481 FREE ASSAY (FT-3): CPT

## 2022-10-18 ENCOUNTER — TELEPHONE (OUTPATIENT)
Dept: ONCOLOGY | Age: 42
End: 2022-10-18

## 2022-10-18 NOTE — TELEPHONE ENCOUNTER
I TRIED TO CALL FELICIANO TO SCHEDULE HER CORTROSYN TEST & LAB WORK AND FELICIANO'S VM WAS FULL SO I COULD NOT LEAVE A MESSAGE. I WILL TRY TO CALL AGAIN.

## 2022-10-19 DIAGNOSIS — E28.1 HYPERANDROGENEMIA: Primary | ICD-10-CM

## 2022-10-20 ENCOUNTER — TELEPHONE (OUTPATIENT)
Dept: INFUSION THERAPY | Facility: MEDICAL CENTER | Age: 42
End: 2022-10-20

## 2022-10-25 NOTE — TELEPHONE ENCOUNTER
I TRIED TO CALL FELICIANO TO SCHEDULE HER CORTROSYN TEST AND HAD TO LEAVE A VM TO CALL THE OFFICE TO SCHEDULE.

## 2022-11-01 NOTE — TELEPHONE ENCOUNTER
I TRIED TO CALL FEL[facility] TO SCHEDULE HER CORTISOL TEST AND LAB WORK AND HAD TO LEAVE A MESSAGE TO CALL THE OFFICE TO SCHEDULE.  I WILL LET THE OFFICE KNOW THAT I AM UNABLE TO SCHEDULE THE PT.

## 2022-11-09 ENCOUNTER — OFFICE VISIT (OUTPATIENT)
Dept: FAMILY MEDICINE CLINIC | Age: 42
End: 2022-11-09
Payer: COMMERCIAL

## 2022-11-09 VITALS
HEIGHT: 67 IN | SYSTOLIC BLOOD PRESSURE: 112 MMHG | OXYGEN SATURATION: 96 % | DIASTOLIC BLOOD PRESSURE: 72 MMHG | HEART RATE: 82 BPM | BODY MASS INDEX: 42.75 KG/M2 | WEIGHT: 272.4 LBS | TEMPERATURE: 97 F

## 2022-11-09 DIAGNOSIS — R53.83 FATIGUE, UNSPECIFIED TYPE: ICD-10-CM

## 2022-11-09 DIAGNOSIS — R06.83 SNORING: ICD-10-CM

## 2022-11-09 DIAGNOSIS — L71.9 ROSACEA: ICD-10-CM

## 2022-11-09 DIAGNOSIS — M25.571 CHRONIC PAIN OF RIGHT ANKLE: ICD-10-CM

## 2022-11-09 DIAGNOSIS — Z12.31 ENCOUNTER FOR SCREENING MAMMOGRAM FOR MALIGNANT NEOPLASM OF BREAST: ICD-10-CM

## 2022-11-09 DIAGNOSIS — G47.00 FREQUENT NOCTURNAL AWAKENING: ICD-10-CM

## 2022-11-09 DIAGNOSIS — Z13.220 SCREENING FOR LIPOID DISORDERS: ICD-10-CM

## 2022-11-09 DIAGNOSIS — G89.29 CHRONIC PAIN OF RIGHT ANKLE: ICD-10-CM

## 2022-11-09 DIAGNOSIS — Z80.1 FAMILY HISTORY OF LUNG CANCER: ICD-10-CM

## 2022-11-09 DIAGNOSIS — Z80.3 FAMILY HISTORY OF BREAST CANCER: ICD-10-CM

## 2022-11-09 DIAGNOSIS — Z00.00 ENCOUNTER FOR WELL ADULT EXAM WITHOUT ABNORMAL FINDINGS: Primary | ICD-10-CM

## 2022-11-09 DIAGNOSIS — G47.9 RESTLESS SLEEPER: ICD-10-CM

## 2022-11-09 PROCEDURE — 99396 PREV VISIT EST AGE 40-64: CPT | Performed by: NURSE PRACTITIONER

## 2022-11-09 PROCEDURE — 99213 OFFICE O/P EST LOW 20 MIN: CPT | Performed by: NURSE PRACTITIONER

## 2022-11-09 RX ORDER — LIOTHYRONINE SODIUM 5 UG/1
TABLET ORAL
COMMUNITY
Start: 2022-10-04

## 2022-11-09 RX ORDER — VALACYCLOVIR HYDROCHLORIDE 1 G/1
1000 TABLET, FILM COATED ORAL 2 TIMES DAILY
Qty: 4 TABLET | Refills: 5 | Status: SHIPPED | OUTPATIENT
Start: 2022-11-09 | End: 2022-11-11

## 2022-11-09 RX ORDER — MULTIVIT-MIN/IRON/FOLIC ACID/K 18-600-40
CAPSULE ORAL
COMMUNITY

## 2022-11-09 RX ORDER — METRONIDAZOLE 10 MG/G
GEL TOPICAL
Qty: 60 G | Refills: 1 | Status: SHIPPED | OUTPATIENT
Start: 2022-11-09

## 2022-11-09 RX ORDER — LEVOTHYROXINE SODIUM 112 MCG
TABLET ORAL
COMMUNITY
Start: 2022-10-17

## 2022-11-09 SDOH — ECONOMIC STABILITY: FOOD INSECURITY: WITHIN THE PAST 12 MONTHS, THE FOOD YOU BOUGHT JUST DIDN'T LAST AND YOU DIDN'T HAVE MONEY TO GET MORE.: NEVER TRUE

## 2022-11-09 SDOH — ECONOMIC STABILITY: FOOD INSECURITY: WITHIN THE PAST 12 MONTHS, YOU WORRIED THAT YOUR FOOD WOULD RUN OUT BEFORE YOU GOT MONEY TO BUY MORE.: NEVER TRUE

## 2022-11-09 ASSESSMENT — ENCOUNTER SYMPTOMS
BACK PAIN: 0
SHORTNESS OF BREATH: 0
COLOR CHANGE: 0
ABDOMINAL PAIN: 0
NAUSEA: 0
COUGH: 0
CHEST TIGHTNESS: 0
SINUS PRESSURE: 0
SORE THROAT: 0
CONSTIPATION: 0
DIARRHEA: 0

## 2022-11-09 ASSESSMENT — SOCIAL DETERMINANTS OF HEALTH (SDOH): HOW HARD IS IT FOR YOU TO PAY FOR THE VERY BASICS LIKE FOOD, HOUSING, MEDICAL CARE, AND HEATING?: NOT HARD AT ALL

## 2022-11-09 NOTE — PROGRESS NOTES
Well Adult Note  Name: Mena Swift Date: 2022   MRN: [id number] Sex: Female   Age: 36 y.o. Ethnicity: Non- / Non    : 1980 Race: White (non-)      Jennifer Celaya is here for well adult exam for her job. History:  Needs work PE. High stres at job. Mother recently diagnosed with mx cancers. Has red cheeks that get worse with hot food, sun and alcohol. Would like gel for this. Had injury to right ankle 1 year ago when walking and carrying heavy water bottle. Has constant pain that shoots from inside ankle up in calf. No current redness, warmth or swelling, but was very bruised after injury. Did not seek care after injury. Taking nothing for pain currently. Continues with fatigue. Mx awakening per night. Is a restless sleeping. Wife states she is snoring and never feels rested in am. Has daytimes sleepiness and is working on weight loss. Review of Systems   Constitutional:  Positive for activity change and fatigue. Negative for appetite change, chills, fever and unexpected weight change. HENT:  Negative for congestion, ear pain, postnasal drip, sinus pressure and sore throat. Eyes:  Negative for visual disturbance. Respiratory:  Negative for cough, chest tightness and shortness of breath. Cardiovascular:  Negative for chest pain and leg swelling. Gastrointestinal:  Negative for abdominal pain, constipation, diarrhea and nausea. Endocrine: Negative for polydipsia, polyphagia and polyuria. Genitourinary:  Negative for difficulty urinating, dyspareunia, dysuria, frequency, genital sores, menstrual problem, pelvic pain, vaginal bleeding, vaginal discharge and vaginal pain. Musculoskeletal:  Positive for arthralgias (right ankle). Negative for back pain, joint swelling and myalgias. Skin:  Negative for color change, pallor, rash and wound. Allergic/Immunologic: Negative for immunocompromised state.    Neurological:  Negative for dizziness, weakness, numbness and headaches. Hematological:  Negative for adenopathy. Psychiatric/Behavioral:  Positive for sleep disturbance. Negative for dysphoric mood. The patient is not nervous/anxious. Allergies   Allergen Reactions    Amoxicillin-Pot Clavulanate Nausea Only         Prior to Visit Medications    Medication Sig Taking? Authorizing Provider   SYNTHROID 112 MCG tablet  Yes Historical Provider, MD   liothyronine (CYTOMEL) 5 MCG tablet TAKE 1 TABLET BY MOUTH DAILY Yes Historical Provider, MD   Cholecalciferol (VITAMIN D) 50 MCG (2000 UT) CAPS capsule Take by mouth Yes Historical Provider, MD   valACYclovir (VALTREX) 1 g tablet Take 1 tablet by mouth 2 times daily for 2 days Yes Lalito Mail, APRN - CNP   metroNIDAZOLE (METROGEL) 1 % gel Apply topically daily.  Yes Lalito Mail, APRN - CNP   cyclobenzaprine (FLEXERIL) 5 MG tablet Take 1 tablet by mouth 2 times daily as needed for Muscle spasms Yes Lalito Mail, APRN - CNP         Past Medical History:   Diagnosis Date    Eczema     Hypotension     Hypothyroidism        Past Surgical History:   Procedure Laterality Date    ENDOMETRIAL ABLATION      TONSILLECTOMY      WISDOM TOOTH EXTRACTION           Family History   Problem Relation Age of Onset    Cancer Mother         breast, ovarian, carcinoid tumor stg 4    Diabetes Mother         pre diabetic    Diabetes Father     Heart Disease Father     Hypertension Sister     High Blood Pressure Sister     Cancer Maternal Grandmother     Cancer Maternal Grandfather     Diabetes Maternal Aunt     Diabetes Maternal Uncle        Social History     Tobacco Use    Smoking status: Never    Smokeless tobacco: Never   Vaping Use    Vaping Use: Never used   Substance Use Topics    Alcohol use: No    Drug use: No       Objective   /72 (Site: Left Lower Arm, Position: Sitting, Cuff Size: Medium Adult)   Pulse 82   Temp 97 °F (36.1 °C) (Tympanic)   Ht 5' 7\" (1.702 m)   Wt 272 lb 6.4 oz (123.6 kg) LMP  (LMP Unknown)   SpO2 96%   BMI 42.66 kg/m²   Wt Readings from Last 3 Encounters:   11/09/22 272 lb 6.4 oz (123.6 kg)   05/11/20 263 lb (119.3 kg)   01/08/19 242 lb (109.8 kg)     There were no vitals filed for this visit. Physical Exam  Vitals and nursing note reviewed. Constitutional:       General: She is not in acute distress. Appearance: Normal appearance. She is well-developed. She is obese. She is not ill-appearing or diaphoretic. HENT:      Head: Normocephalic and atraumatic. Right Ear: Tympanic membrane, ear canal and external ear normal. There is no impacted cerumen. Left Ear: Tympanic membrane, ear canal and external ear normal. There is no impacted cerumen. Mouth/Throat:      Mouth: Mucous membranes are moist.   Eyes:      Conjunctiva/sclera: Conjunctivae normal.   Cardiovascular:      Rate and Rhythm: Normal rate and regular rhythm. Pulses: Normal pulses. Heart sounds: Normal heart sounds. No murmur heard. Comments: No LE edema  Pulmonary:      Effort: Pulmonary effort is normal.      Breath sounds: Normal breath sounds. No wheezing. Abdominal:      General: Bowel sounds are normal.   Musculoskeletal:         General: Tenderness (medial right ankle, extending up into shin) present. No swelling, deformity or signs of injury. Cervical back: Neck supple. Right lower leg: No edema. Left lower leg: No edema. Skin:     General: Skin is warm and dry. Neurological:      General: No focal deficit present. Mental Status: She is alert and oriented to person, place, and time. Mental status is at baseline. Psychiatric:         Mood and Affect: Mood normal.         Behavior: Behavior normal.         Thought Content: Thought content normal.         Judgment: Judgment normal.         Assessment   Plan   1.  Encounter for well adult exam without abnormal findings  Update labs for PE  Will call with test results  Patient advised to follow heart healthy, low fat  diet and 150 minutes of cardiovascular exercise per week   Continue with weight loss ( 13 lbs so far with diet)  Schd. mamm  -     CBC with Auto Differential; Future  -     Comprehensive Metabolic Panel; Future  2. Family history of lung cancer  Pt requested  Will call with test results  No current symptoms herself  -     XR CHEST (2 VW); Future  3. Family history of breast cancer  -     JAGJIT DIGITAL SCREEN W OR WO CAD BILATERAL; Future  4. Encounter for screening mammogram for malignant neoplasm of breast  -     JAGJIT DIGITAL SCREEN W OR WO CAD BILATERAL; Future  5. Rosacea  Trial gel  Call INB or worsening    -     metroNIDAZOLE (METROGEL) 1 % gel; Apply topically daily. , Disp-60 g, R-1, Normal  6. Chronic pain of right ankle  Check xray, may refer to Dr parekh  -     XR ANKLE RIGHT (MIN 3 VIEWS); Future  7. Screening for lipoid disorders  -     Lipid Panel; Future  8. Fatigue, unspecified type  Continue with endo  Ongoing problem, also check sleep study, in home one done a few years ago and inconclusive    -     MIGUELITO Screen with Reflex; Future  -     Vitamin B12; Future  -     PTH, Intact; Future  -     Baseline Diagnostic Sleep Study; Future  9. Snoring  -     Baseline Diagnostic Sleep Study; Future  10. Restless sleeper  -     Baseline Diagnostic Sleep Study; Future  11. Frequent nocturnal awakening  -     Baseline Diagnostic Sleep Study;  Future       Personalized Preventive Plan   Current Health Maintenance Status  Immunization History   Administered Date(s) Administered    COVID-19, PFIZER PURPLE top, DILUTE for use, (age 15 y+), 30mcg/0.3mL 11/19/2021    Tdap (Boostrix, Adacel) 01/08/2013, 06/16/2013        Health Maintenance   Topic Date Due    Hepatitis C screen  Never done    COVID-19 Vaccine (2 - Pfizer series) 12/10/2021    Cervical cancer screen  01/24/2022    Flu vaccine (1) 11/09/2023 (Originally 8/1/2022)    Depression Screen  03/04/2023    Diabetes screen  06/03/2023    DTaP/Tdap/Td vaccine (3 - Td or Tdap) 06/16/2023    Lipids  10/17/2023    Hepatitis A vaccine  Aged Out    Hib vaccine  Aged Out    Meningococcal (ACWY) vaccine  Aged Out    Pneumococcal 0-64 years Vaccine  Aged Out    HIV screen  Discontinued     Recommendations for [employer] Due: see orders and patient instructions/AVS.    No follow-ups on file.

## 2022-11-12 ENCOUNTER — HOSPITAL ENCOUNTER (OUTPATIENT)
Age: 42
Discharge: HOME | End: 2022-11-12
Payer: COMMERCIAL

## 2022-11-12 DIAGNOSIS — R53.83 FATIGUE, UNSPECIFIED TYPE: ICD-10-CM

## 2022-11-12 DIAGNOSIS — Z13.220 SCREENING FOR LIPOID DISORDERS: ICD-10-CM

## 2022-11-12 DIAGNOSIS — Z00.00 ENCOUNTER FOR WELL ADULT EXAM WITHOUT ABNORMAL FINDINGS: ICD-10-CM

## 2022-11-12 LAB
ABSOLUTE EOS #: 0.11 K/UL (ref 0–0.44)
ABSOLUTE IMMATURE GRANULOCYTE: <0.03 K/UL (ref 0–0.3)
ABSOLUTE LYMPH #: 1.55 K/UL (ref 1.1–3.7)
ABSOLUTE MONO #: 0.4 K/UL (ref 0.1–1.2)
ALBUMIN SERPL-MCNC: 4.3 G/DL (ref 3.5–5.2)
ALBUMIN/GLOBULIN RATIO: 1.8 (ref 1–2.5)
ALP BLD-CCNC: 62 U/L (ref 35–104)
ALT SERPL-CCNC: 27 U/L (ref 5–33)
ANION GAP SERPL CALCULATED.3IONS-SCNC: 9 MMOL/L (ref 9–17)
ANTI DNA DOUBLE STRANDED: <0.5 IU/ML
ANTI-NUCLEAR ANTIBODY (ANA): NEGATIVE
AST SERPL-CCNC: 20 U/L
BASOPHILS # BLD: 1 % (ref 0–2)
BASOPHILS ABSOLUTE: 0.04 K/UL (ref 0–0.2)
BILIRUB SERPL-MCNC: 0.5 MG/DL (ref 0.3–1.2)
BUN BLDV-MCNC: 17 MG/DL (ref 6–20)
CALCIUM SERPL-MCNC: 9.2 MG/DL (ref 8.6–10.4)
CHLORIDE BLD-SCNC: 102 MMOL/L (ref 98–107)
CHOLESTEROL/HDL RATIO: 3.9
CHOLESTEROL: 159 MG/DL
CO2: 23 MMOL/L (ref 20–31)
CREAT SERPL-MCNC: 0.63 MG/DL (ref 0.5–0.9)
ENA ANTIBODIES SCREEN: 0.4 U/ML
EOSINOPHILS RELATIVE PERCENT: 2 % (ref 1–4)
GFR SERPL CREATININE-BSD FRML MDRD: >60 ML/MIN/1.73M2
GLUCOSE BLD-MCNC: 99 MG/DL (ref 70–99)
HCT VFR BLD CALC: 41.2 % (ref 36.3–47.1)
HDLC SERPL-MCNC: 41 MG/DL
HEMOGLOBIN: 14 G/DL (ref 11.9–15.1)
IMMATURE GRANULOCYTES: 0 %
LDL CHOLESTEROL: 105 MG/DL (ref 0–130)
LYMPHOCYTES # BLD: 24 % (ref 24–43)
MCH RBC QN AUTO: 28 PG (ref 25.2–33.5)
MCHC RBC AUTO-ENTMCNC: 34 G/DL (ref 28.4–34.8)
MCV RBC AUTO: 82.4 FL (ref 82.6–102.9)
MONOCYTES # BLD: 6 % (ref 3–12)
NRBC AUTOMATED: 0 PER 100 WBC
PDW BLD-RTO: 14.1 % (ref 11.8–14.4)
PLATELET # BLD: 213 K/UL (ref 138–453)
PMV BLD AUTO: 10.1 FL (ref 8.1–13.5)
POTASSIUM SERPL-SCNC: 4.2 MMOL/L (ref 3.7–5.3)
PTH INTACT: 29 PG/ML (ref 14–72)
RBC # BLD: 5 M/UL (ref 3.95–5.11)
RBC # BLD: ABNORMAL 10*6/UL
SEG NEUTROPHILS: 67 % (ref 36–65)
SEGMENTED NEUTROPHILS ABSOLUTE COUNT: 4.37 K/UL (ref 1.5–8.1)
SODIUM BLD-SCNC: 134 MMOL/L (ref 135–144)
TOTAL PROTEIN: 6.7 G/DL (ref 6.4–8.3)
TRIGL SERPL-MCNC: 65 MG/DL
VITAMIN B-12: 1675 PG/ML (ref 232–1245)
WBC # BLD: 6.5 K/UL (ref 3.5–11.3)

## 2022-11-12 PROCEDURE — 83970 ASSAY OF PARATHORMONE: CPT

## 2022-11-12 PROCEDURE — 85025 COMPLETE CBC W/AUTO DIFF WBC: CPT

## 2022-11-12 PROCEDURE — 36415 COLL VENOUS BLD VENIPUNCTURE: CPT

## 2022-11-12 PROCEDURE — 80061 LIPID PANEL: CPT

## 2022-11-12 PROCEDURE — 80053 COMPREHEN METABOLIC PANEL: CPT

## 2022-11-12 PROCEDURE — 86225 DNA ANTIBODY NATIVE: CPT

## 2022-11-12 PROCEDURE — 82607 VITAMIN B-12: CPT

## 2022-11-12 PROCEDURE — 86038 ANTINUCLEAR ANTIBODIES: CPT

## 2022-12-22 ENCOUNTER — TELEPHONE (OUTPATIENT)
Dept: FAMILY MEDICINE CLINIC | Age: 42
End: 2022-12-22

## 2022-12-22 DIAGNOSIS — R53.83 FATIGUE, UNSPECIFIED TYPE: Primary | ICD-10-CM

## 2022-12-22 NOTE — TELEPHONE ENCOUNTER
Patient was advised to recheck her b12 last month after labs showed elevated levels. The patient needs a new order placed. Please advise.

## 2022-12-29 ENCOUNTER — HOSPITAL ENCOUNTER (OUTPATIENT)
Dept: MAMMOGRAPHY | Age: 42
Discharge: HOME | End: 2022-12-31
Payer: COMMERCIAL

## 2022-12-29 DIAGNOSIS — Z12.31 ENCOUNTER FOR SCREENING MAMMOGRAM FOR MALIGNANT NEOPLASM OF BREAST: ICD-10-CM

## 2022-12-29 DIAGNOSIS — R92.8 ABNORMAL MAMMOGRAM OF BOTH BREASTS: Primary | ICD-10-CM

## 2022-12-29 DIAGNOSIS — Z80.3 FAMILY HISTORY OF BREAST CANCER: ICD-10-CM

## 2022-12-29 PROCEDURE — 77067 SCR MAMMO BI INCL CAD: CPT

## 2023-01-07 ENCOUNTER — HOSPITAL ENCOUNTER (OUTPATIENT)
Age: 43
Setting detail: SPECIMEN
Discharge: HOME | End: 2023-01-07
Payer: COMMERCIAL

## 2023-01-07 DIAGNOSIS — R53.83 FATIGUE, UNSPECIFIED TYPE: ICD-10-CM

## 2023-01-07 PROCEDURE — 82607 VITAMIN B-12: CPT

## 2023-01-07 PROCEDURE — 36415 COLL VENOUS BLD VENIPUNCTURE: CPT

## 2023-01-08 LAB — VITAMIN B-12: 1037 PG/ML (ref 232–1245)

## 2023-01-13 ENCOUNTER — HOSPITAL ENCOUNTER (OUTPATIENT)
Dept: ULTRASOUND IMAGING | Age: 43
End: 2023-01-13
Payer: COMMERCIAL

## 2023-01-13 ENCOUNTER — HOSPITAL ENCOUNTER (OUTPATIENT)
Dept: MAMMOGRAPHY | Age: 43
End: 2023-01-13
Payer: COMMERCIAL

## 2023-01-13 DIAGNOSIS — R92.8 ABNORMAL MAMMOGRAM OF BOTH BREASTS: ICD-10-CM

## 2023-01-13 PROCEDURE — 76642 ULTRASOUND BREAST LIMITED: CPT

## 2023-01-13 PROCEDURE — G0279 TOMOSYNTHESIS, MAMMO: HCPCS

## 2023-01-16 DIAGNOSIS — R92.8 ABNORMAL MAMMOGRAM OF BOTH BREASTS: Primary | ICD-10-CM

## 2023-01-16 NOTE — PROGRESS NOTES
Breast You can access the FollowMyHealth Patient Portal offered by [facility] by registering at the following website: [email or web address]. By joining [facility]’s FollowMyHealth portal, you will also be able to view your health information using other applications (apps) compatible with our system.

## 2023-01-23 ENCOUNTER — HOSPITAL ENCOUNTER (OUTPATIENT)
Dept: ULTRASOUND IMAGING | Age: 43
Discharge: HOME | End: 2023-01-25
Payer: COMMERCIAL

## 2023-01-23 ENCOUNTER — HOSPITAL ENCOUNTER (OUTPATIENT)
Dept: MAMMOGRAPHY | Age: 43
Discharge: HOME | End: 2023-01-25
Payer: COMMERCIAL

## 2023-01-23 VITALS — DIASTOLIC BLOOD PRESSURE: 90 MMHG | SYSTOLIC BLOOD PRESSURE: 126 MMHG | HEART RATE: 72 BPM

## 2023-01-23 DIAGNOSIS — R92.8 ABNORMAL MAMMOGRAM OF BOTH BREASTS: ICD-10-CM

## 2023-01-23 DIAGNOSIS — Z98.890 STATUS POST BREAST BIOPSY: ICD-10-CM

## 2023-01-23 PROCEDURE — 19083 BX BREAST 1ST LESION US IMAG: CPT

## 2023-01-23 PROCEDURE — 88305 TISSUE EXAM BY PATHOLOGIST: CPT

## 2023-01-23 PROCEDURE — 77065 DX MAMMO INCL CAD UNI: CPT

## 2023-01-23 PROCEDURE — A4648 IMPLANTABLE TISSUE MARKER: HCPCS

## 2023-01-23 PROCEDURE — 88342 IMHCHEM/IMCYTCHM 1ST ANTB: CPT

## 2023-01-24 PROCEDURE — 2500000003 HC RX 250 WO HCPCS

## 2023-01-25 ENCOUNTER — HOSPITAL ENCOUNTER (OUTPATIENT)
Dept: ULTRASOUND IMAGING | Age: 43
Discharge: HOME | End: 2023-01-27
Payer: COMMERCIAL

## 2023-01-25 DIAGNOSIS — N63.10 MASS OF RIGHT BREAST, UNSPECIFIED QUADRANT: ICD-10-CM

## 2023-01-25 DIAGNOSIS — L08.9 SKIN INFECTION: Primary | ICD-10-CM

## 2023-01-25 LAB — SURGICAL PATHOLOGY REPORT: NORMAL

## 2023-01-25 PROCEDURE — 76642 ULTRASOUND BREAST LIMITED: CPT

## 2023-01-26 ENCOUNTER — OFFICE VISIT (OUTPATIENT)
Dept: FAMILY MEDICINE CLINIC | Age: 43
End: 2023-01-26
Payer: COMMERCIAL

## 2023-01-26 VITALS
BODY MASS INDEX: 41.94 KG/M2 | TEMPERATURE: 96.5 F | HEART RATE: 78 BPM | SYSTOLIC BLOOD PRESSURE: 118 MMHG | WEIGHT: 267.2 LBS | HEIGHT: 67 IN | DIASTOLIC BLOOD PRESSURE: 74 MMHG | OXYGEN SATURATION: 98 %

## 2023-01-26 DIAGNOSIS — R23.4 BREAST SKIN CHANGES: Primary | ICD-10-CM

## 2023-01-26 LAB — HBA1C MFR BLD: 5.4 %

## 2023-01-26 PROCEDURE — 83036 HEMOGLOBIN GLYCOSYLATED A1C: CPT | Performed by: NURSE PRACTITIONER

## 2023-01-26 PROCEDURE — 99213 OFFICE O/P EST LOW 20 MIN: CPT | Performed by: NURSE PRACTITIONER

## 2023-01-26 ASSESSMENT — ENCOUNTER SYMPTOMS
NAUSEA: 0
CHEST TIGHTNESS: 0
VOMITING: 0
SHORTNESS OF BREATH: 0
ABDOMINAL PAIN: 0
COUGH: 0

## 2023-01-26 ASSESSMENT — PATIENT HEALTH QUESTIONNAIRE - PHQ9
1. LITTLE INTEREST OR PLEASURE IN DOING THINGS: 0
2. FEELING DOWN, DEPRESSED OR HOPELESS: 0
SUM OF ALL RESPONSES TO PHQ9 QUESTIONS 1 & 2: 0
SUM OF ALL RESPONSES TO PHQ QUESTIONS 1-9: 0

## 2023-01-26 NOTE — PROGRESS NOTES
Visit Information    Have you changed or started any medications since your last visit including any over-the-counter medicines, vitamins, or herbal medicines? no   Have you stopped taking any of your medications? Is so, why? -  no  Are you having any side effects from any of your medications? - no    Have you seen any other physician or provider since your last visit?  no   Have you had any other diagnostic tests since your last visit?  no   Have you been seen in the emergency room and/or had an admission in a hospital since we last saw you?  no   Have you had your routine dental cleaning in the past 6 months?  no     Do you have an active MyChart account? If no, what is the barrier?   Yes    Patient Care Team:  DOMO Palmer CNP as PCP - General (Family Nurse Practitioner)  DOMO Palmer CNP as PCP - [facility] Provider    Medical History Review  Past Medical, Family, and Social History reviewed and  contribute to the patient presenting condition    Health Maintenance   Topic Date Due    Hepatitis C screen  Never done    COVID-19 Vaccine (2 - Pfizer series) 12/10/2021    Cervical cancer screen  01/24/2022    Flu vaccine (1) 11/09/2023 (Originally 8/1/2022)    Depression Screen  03/04/2023    DTaP/Tdap/Td vaccine (3 - Td or Tdap) 06/16/2023    Lipids  11/12/2027    Hepatitis A vaccine  Aged Out    Hib vaccine  Aged Out    Meningococcal (ACWY) vaccine  Aged Out    Pneumococcal 0-64 years Vaccine  Aged Out    HIV screen  Discontinued

## 2023-01-26 NOTE — PROGRESS NOTES
[address] [facility], [address]  [phone]      Betsy Kraus is a 36 y.o. female who presents today for her  medicalconditions/complaints as noted below. Betsy Kraus is c/o of Wound Check (Open hematoma, right breast. Bx done, bruised afterward but not at site bx done. Opened up, went back to radiologist that did it and states radiologist was very defensive but another radiologist confirmed it was from procedure. States the radiologists were discussing/arguing in front of her)  . HPI:    Wound Check    Had breast biopsy completed on 1/23/2023 for abnormal mammogram.  States the same day as the procedure she had developed what she thought was a hematoma on the right breast.  States there was bruising afterwards but not at the same site as the biopsy. The area opened up she went to the radiologist and they did another ultrasound and did not see a hematoma. She is doing dressing changes on this. denies fever, chills. Pathology just came back negative for breast cancer.    Past Medical History:   Diagnosis Date    Eczema     Hashimoto's disease     Hypotension     Hypothyroidism       Past Surgical History:   Procedure Laterality Date    ENDOMETRIAL ABLATION      TONSILLECTOMY      US BREAST BIOPSY W LOC DEVICE 1ST LESION LEFT Left 1/23/2023    US BREAST BIOPSY W LOC DEVICE 1ST LESION LEFT 1/23/2023 [facility] ULTRASOUND    US BREAST BIOPSY W LOC DEVICE 1ST LESION RIGHT Right 1/23/2023    US BREAST BIOPSY W LOC DEVICE 1ST LESION RIGHT 1/23/2023 [facility] ULTRASOUND    WISDOM TOOTH EXTRACTION       Family History   Problem Relation Age of Onset    Cancer Mother         breast, ovarian, carcinoid tumor stg 4    Diabetes Mother         pre diabetic    Diabetes Father     Heart Disease Father     Hypertension Sister     High Blood Pressure Sister     Cancer Maternal Grandmother     Cancer Maternal Grandfather     Diabetes Maternal Aunt     Diabetes Maternal Uncle Social History     Tobacco Use    Smoking status: Never    Smokeless tobacco: Never   Substance Use Topics    Alcohol use: No      Current Outpatient Medications   Medication Sig Dispense Refill    SYNTHROID 112 MCG tablet       liothyronine (CYTOMEL) 5 MCG tablet TAKE 1 TABLET BY MOUTH DAILY      Cholecalciferol (VITAMIN D) 50 MCG (2000 UT) CAPS capsule Take by mouth      metroNIDAZOLE (METROGEL) 1 % gel Apply topically daily. 60 g 1    cyclobenzaprine (FLEXERIL) 5 MG tablet Take 1 tablet by mouth 2 times daily as needed for Muscle spasms 60 tablet 0     No current facility-administered medications for this visit. Allergies   Allergen Reactions    Amoxicillin-Pot Clavulanate Nausea Only       Health Maintenance   Topic Date Due    Hepatitis C screen  Never done    COVID-19 Vaccine (2 - Pfizer series) 12/10/2021    Cervical cancer screen  01/24/2022    Flu vaccine (1) 11/09/2023 (Originally 8/1/2022)    Depression Screen  03/04/2023    DTaP/Tdap/Td vaccine (3 - Td or Tdap) 06/16/2023    Lipids  11/12/2027    Hepatitis A vaccine  Aged Out    Hib vaccine  Aged Out    Meningococcal (ACWY) vaccine  Aged Out    Pneumococcal 0-64 years Vaccine  Aged Out    HIV screen  Discontinued       Subjective:      Review of Systems   Constitutional:  Negative for appetite change, chills, diaphoresis, fatigue and fever. Eyes:  Negative for visual disturbance. Respiratory:  Negative for cough, chest tightness and shortness of breath. Cardiovascular:  Negative for chest pain, palpitations and leg swelling. Gastrointestinal:  Negative for abdominal pain, nausea and vomiting. Endocrine: Negative for polydipsia, polyphagia and polyuria. Genitourinary:  Negative for difficulty urinating. Skin:  Positive for wound. Negative for pallor and rash. Neurological:  Negative for dizziness and headaches. Hematological:  Negative for adenopathy. Psychiatric/Behavioral:  Negative for sleep disturbance.         Objective: Physical Exam  Vitals and nursing note reviewed. Constitutional:       Appearance: Normal appearance. She is obese. She is not ill-appearing. HENT:      Head: Normocephalic and atraumatic. Pulmonary:      Effort: Pulmonary effort is normal.   Chest:       Musculoskeletal:      Cervical back: Neck supple. Skin:     General: Skin is warm and dry. Neurological:      General: No focal deficit present. Mental Status: She is alert and oriented to person, place, and time. Mental status is at baseline. Psychiatric:         Mood and Affect: Mood normal.         Behavior: Behavior normal.         Thought Content: Thought content normal.         Judgment: Judgment normal.       Assessment:       Diagnosis Orders   1. Breast skin changes  POCT glycosylated hemoglobin (Hb A1C)        Results for orders placed or performed in visit on 01/26/23   POCT glycosylated hemoglobin (Hb A1C)   Result Value Ref Range    Hemoglobin A1C 5.4 %       SURGICAL PATHOLOGY REPORT  Order: [id number]  Status: Final result    Visible to patient: Yes (seen)    Next appt: 02/16/2023 at 03:00 PM in Oncology ([address])    3 Result Notes  Component 1/23/23 1411    Surgical Pathology Report -- Diagnosis --   A. RIGHT BREAST, 3:00, ULTRASOUND-GUIDED CORE NEEDLE BIOPSIES:   - BENIGN BREAST TISSUE WITH BENIGN APOCRINE EPITHELIAL LINED FIBROUS    WALLED CYST.   - NEGATIVE FOR ATYPIA AND MALIGNANCY. B.  LEFT BREAST, 2-3:00, ULTRASOUND-GUIDED CORE NEEDLE BIOPSIES:   - BENIGN FIBROCYSTIC CHANGES WITH ADENOSIS AND PARTIALLY SCLEROTIC    FIBROADENOMA. - NEGATIVE FOR ATYPIA AND MALIGNANCY. Chris Moncada. Ryan Mccracken M.D.   **Electronically Signed Out**         sls/1/25/2023         Plan:      Return if symptoms worsen or fail to improve.   Orders Placed This Encounter   Procedures    POCT glycosylated hemoglobin (Hb A1C)     Unclear how she obtained this skin problem, but continue neosporin with dressing change daily, and supportive bra  Avoid heavy lifting and keep area clean and dry  Call INB or worsening        Patient given educational materials - see patient instructions. Discussed use,benefit, and side effects of prescribed medications. All patient questions answered. Pt voiced understanding. Reviewed health maintenance. Instructed to continue currentmedications, diet and exercise.     Electronically signed by DOMO Woodruff CNP, CNP on 1/26/2023 at 10:53 AM

## 2023-02-02 ENCOUNTER — TELEPHONE (OUTPATIENT)
Dept: FAMILY MEDICINE CLINIC | Age: 43
End: 2023-02-02

## 2023-02-02 DIAGNOSIS — R23.4 BREAST SKIN CHANGES: Primary | ICD-10-CM

## 2023-02-02 RX ORDER — CEPHALEXIN 500 MG/1
500 CAPSULE ORAL 3 TIMES DAILY
Qty: 30 CAPSULE | Refills: 0 | Status: SHIPPED | OUTPATIENT
Start: 2023-02-02 | End: 2023-02-12

## 2023-02-02 NOTE — TELEPHONE ENCOUNTER
Does she want to try an antibiotic? Also I can place referral to wound care if she is ok with that for another opinion?

## 2023-02-02 NOTE — TELEPHONE ENCOUNTER
Patient is calling stating that she was in the office on 1/26/23 for breast skin changes. She is stating that it has gotten worse and very painful, red and inflamed. Please advise     Patient states she will be in meeting all day and it is ok to leave a message on her voice mail.

## 2023-02-16 ENCOUNTER — INITIAL CONSULT (OUTPATIENT)
Dept: ONCOLOGY | Age: 43
End: 2023-02-16

## 2023-02-16 DIAGNOSIS — Z80.3 FAMILY HISTORY OF BREAST CANCER: Primary | ICD-10-CM

## 2023-02-16 DIAGNOSIS — Z80.41 FAMILY HISTORY OF OVARIAN CANCER: ICD-10-CM

## 2023-02-16 DIAGNOSIS — Z80.9 FAMILY HISTORY OF CARCINOID TUMOR: ICD-10-CM

## 2023-02-16 DIAGNOSIS — Z91.89 AT HIGH RISK FOR BREAST CANCER: ICD-10-CM

## 2023-02-17 NOTE — PROGRESS NOTES
[facility] [place] [facility]   Hereditary Cancer Risk Assessment     Name: Kristopher Alcazar   YOB: 1980   Date of Consultation: 2/16/2023    Ms. Juventino Piña was seen at the [address] [place] [address] for genetic counseling on 2/16/23. Ms. Juventino Piña was referred by DOMO Cope* due to her family history of cancer and to discuss her breast cancer risk factors. PERSONAL HISTORY   Ms. Juventino Piña is a 36 y.o.  female with no personal history of cancer. She reports:     Menarche at age: 16y  First child at age: NA, nulliparous   Menopause at age: NA, pre menopausal   Hysterectomy: NA  Oophorectomy: NA  Last mammogram: 12/29/22, screening mammogram TC = 28.1%   Last breast MRI: Never   In January 2023, she underwent bilateral ultrasound guided biopsies. Right - benign apocrine epithelial lined fibrous walled cyst. Left - benign fibrocystic changes and fibroadenoma. FAMILY HISTORY  Ms. Juventino Piña has no biological children. She has one brother and one sister, no known cancers. Ms. Rony Hurtado mother has a significant history of multiple primary cancers including breast cancer requiring mastectomy and hormone therapy, uterine / ovarian cancer, and most recently carcinoid lung cancer. It's unclear if she has undergone genetic testing. There are no other known cancers in her extended maternal family. Her paternal family history is unknown. Ms. Juventino Piña reports  ancestry and denies any known Ashkenazi Muslim heritage. RISK ASSESSMENT   We discussed that approximately 5-10% of cancers are due to a hereditary gene mutation which causes an increased risk for certain cancers. Hereditary cancers are typically diagnosed at younger ages (under age 46y) and occur in multiple generations of a family.  Multiple individuals with the same type of cancer (example: breast or colorectal) or uncommon cancers (example: ovarian, pancreatic, male breast cancer) are also features of hereditary cancers. In summary, Ms. [address] (NCCN) guidelines for genetic testing of the BRCA1/2 genes due to having a first degree relative with breast and ovarian/uterine cancer. This relative is unavailable for any additional genetic testing. Thus, Ms. Cleve Lan is an appropriate candidate for genetic testing. The NCCN guidelines also recognize that an individual's personal and/or family history may be explained by more than one inherited cancer syndrome. Thus, a multi-gene panel may be more efficient, more cost effecting, and increases the yield of detecting a hereditary mutation which would impact medical management. Given her personal and/or family history, we recommend testing for the following genes at minimum: DI, CHEK2, and PALB2. DISCUSSION  We discussed that the BRCA1/2 genes are the most common genes associated with hereditary breast, ovarian, prostate and pancreatic cancer. We also discussed that genetic testing is available for multiple other genes related to hereditary cancer. Some of these genes are known to carry a significant increased risk for several cancers including colon, breast, uterine, ovarian, stomach, and pancreatic cancer, while some of these genes are believed to have a moderate increased risk for breast and other cancers. We discussed the possibility of finding a mutation in genes with limited information to guide medical management, as well we as the possibility of identifying variants of uncertain significance (VUS). We discussed the risks, benefits, and limitations of genetic testing. Possible test results were discussed as well as potential screening and prevention strategies.  Specifically, we discussed:    1) Increased breast cancer surveillance by mammogram and breast MRI as well as the option of prophylactic mastectomy  2) Possible recommendations for prophylactic oophorectomy for results which suggest an increased risk for ovarian cancer  3) Possible recommendations for prophylactic hysterectomy for results which suggest an increased risk for endometrial cancer  4) Increased colon cancer surveillance by colonoscopy screening every 1-2 years beginning by age 18-19y    Lastly, we discussed that the results of Ms. Dobson's genetic testing may be beneficial in defining her risk for cancer as well as for her family members. SUMMARY & PLAN  1) Ms. Misti Penn meets the NCCN criteria for genetic testing based on her family history of breast and ovarian cancer. 2) Genetic testing via a multi-gene panel was recommended and offered to Ms. Dobson. 3) Ms. Misti Penn elected to proceed with the CancerNext Expanded + RNA [facility] Hereditary Cancer Gene Panel. 4) Ms. Misti Penn is aware that she will receive a notification from the laboratory [facility]) if the out of pocket cost for testing exceeds $250 (based on individual insurance plan) and the alternative option to proceed with the self pay price of $250.     5) Informed consent was obtained and a blood sample was sent to [facility]. We will call Ms. Misti Penn with results as soon as they are available. A follow up appointment may be recommended. A summary letter with results and final medical management recommendations will be sent once available. A total of 30 minutes were spent face to face with Ms. Dobson and 50% of the time was spent educating and counseling. The [facility] would be glad to offer our assistance should you have any questions or concerns about this information. Please feel free to contact us at [phone]. Mariel Wagoner.  Erica Reyes MS, [facility]   Licensed Genetic Counselor

## 2023-03-06 ENCOUNTER — TELEPHONE (OUTPATIENT)
Dept: ONCOLOGY | Age: 43
End: 2023-03-06

## 2023-03-06 NOTE — TELEPHONE ENCOUNTER
Attempt to contact patient regarding genetic test results. VM box is full and unable to leave message.

## 2023-03-08 ENCOUNTER — PATIENT MESSAGE (OUTPATIENT)
Dept: ONCOLOGY | Age: 43
End: 2023-03-08

## 2023-12-10 RX ORDER — VALACYCLOVIR HYDROCHLORIDE 1 G/1
TABLET, FILM COATED ORAL
Qty: 4 TABLET | Refills: 5 | Status: SHIPPED | OUTPATIENT
Start: 2023-12-10

## 2024-02-02 ENCOUNTER — TELEMEDICINE (OUTPATIENT)
Dept: FAMILY MEDICINE CLINIC | Age: 44
End: 2024-02-02
Payer: COMMERCIAL

## 2024-02-02 ENCOUNTER — NURSE ONLY (OUTPATIENT)
Dept: PRIMARY CARE CLINIC | Age: 44
End: 2024-02-02

## 2024-02-02 DIAGNOSIS — M79.671 RIGHT FOOT PAIN: Primary | ICD-10-CM

## 2024-02-02 PROCEDURE — 99213 OFFICE O/P EST LOW 20 MIN: CPT | Performed by: NURSE PRACTITIONER

## 2024-02-02 SDOH — ECONOMIC STABILITY: FOOD INSECURITY: WITHIN THE PAST 12 MONTHS, THE FOOD YOU BOUGHT JUST DIDN'T LAST AND YOU DIDN'T HAVE MONEY TO GET MORE.: NEVER TRUE

## 2024-02-02 SDOH — ECONOMIC STABILITY: INCOME INSECURITY: HOW HARD IS IT FOR YOU TO PAY FOR THE VERY BASICS LIKE FOOD, HOUSING, MEDICAL CARE, AND HEATING?: NOT HARD AT ALL

## 2024-02-02 SDOH — ECONOMIC STABILITY: FOOD INSECURITY: WITHIN THE PAST 12 MONTHS, YOU WORRIED THAT YOUR FOOD WOULD RUN OUT BEFORE YOU GOT MONEY TO BUY MORE.: NEVER TRUE

## 2024-02-02 SDOH — ECONOMIC STABILITY: HOUSING INSECURITY
IN THE LAST 12 MONTHS, WAS THERE A TIME WHEN YOU DID NOT HAVE A STEADY PLACE TO SLEEP OR SLEPT IN A SHELTER (INCLUDING NOW)?: NO

## 2024-02-02 ASSESSMENT — PATIENT HEALTH QUESTIONNAIRE - PHQ9
SUM OF ALL RESPONSES TO PHQ QUESTIONS 1-9: 0
2. FEELING DOWN, DEPRESSED OR HOPELESS: 0
SUM OF ALL RESPONSES TO PHQ QUESTIONS 1-9: 0
SUM OF ALL RESPONSES TO PHQ QUESTIONS 1-9: 0
SUM OF ALL RESPONSES TO PHQ9 QUESTIONS 1 & 2: 0
1. LITTLE INTEREST OR PLEASURE IN DOING THINGS: 0
SUM OF ALL RESPONSES TO PHQ QUESTIONS 1-9: 0

## 2024-02-02 NOTE — PROGRESS NOTES
Molly Dobson, was evaluated through a synchronous (real-time) audio-video encounter. The patient (or guardian if applicable) is aware that this is a billable service, which includes applicable co-pays. This Virtual Visit was conducted with patient's (and/or legal guardian's) consent. Patient identification was verified, and a caregiver was present when appropriate.   The patient was located at Home: [address] [place] [address]  [place] [address]  Provider was located at Home (Appt Dept State): [place]      Molly Dobson (:  1980) is a Established patient, presenting virtually for evaluation of the following:    Assessment & Plan   Below is the assessment and plan developed based on review of pertinent history, physical exam, labs, studies, and medications.  1. Right foot pain  -     XR FOOT RIGHT (MIN 3 VIEWS); Future  Check xray foot for now with moderate degree pain  Wear supportive shoes when ambulatory for now and avoid prolonged walking and no running  Ok to continue ice, rest, nsaids/tylenol if needed  Will call with test results    No follow-ups on file.       Subjective   HPI  Pt. calling in today for evaluation of right foot pain.  States she had a previous injury but never followed up on as had to have gotten better.  States recently she was shoveling snow and states she stepped wrong and has been having significant pain rated at a 5 on the outer/inner part of her right foot.  States she feels swelling when she is wearing shoes but does not visibly see swelling.  She denies any redness or warmth.  States she has pain even when resting and sitting.  She has tried Tylenol a few times but does not like to take medication regularly.  Review of Systems       Objective   Patient-Reported Vitals  Patient-Reported Weight: 267  Patient-Reported Height: 5'7       Physical Exam  [INSTRUCTIONS:  \"[x]\" Indicates a positive item  \"[]\" Indicates a negative item  -- DELETE ALL ITEMS NOT EXAMINED]    Constitutional:

## 2024-03-09 ENCOUNTER — HOSPITAL ENCOUNTER (OUTPATIENT)
Age: 44
Discharge: HOME | End: 2024-03-09
Payer: COMMERCIAL

## 2024-03-09 LAB
T3FREE SERPL-MCNC: 2.8 PG/ML (ref 2–4.4)
T4 FREE SERPL-MCNC: 1.3 NG/DL (ref 0.93–1.7)
TSH SERPL DL<=0.05 MIU/L-ACNC: 2.05 UIU/ML (ref 0.27–4.2)

## 2024-03-09 PROCEDURE — 84443 ASSAY THYROID STIM HORMONE: CPT

## 2024-03-09 PROCEDURE — 36415 COLL VENOUS BLD VENIPUNCTURE: CPT

## 2024-03-09 PROCEDURE — 84439 ASSAY OF FREE THYROXINE: CPT

## 2024-03-09 PROCEDURE — 84481 FREE ASSAY (FT-3): CPT

## 2024-03-16 ENCOUNTER — HOSPITAL ENCOUNTER (OUTPATIENT)
Age: 44
Discharge: HOME | End: 2024-03-16
Payer: COMMERCIAL

## 2024-03-16 LAB — 25(OH)D3 SERPL-MCNC: 39.8 NG/ML (ref 30–100)

## 2024-03-16 PROCEDURE — 36415 COLL VENOUS BLD VENIPUNCTURE: CPT

## 2024-03-16 PROCEDURE — 82306 VITAMIN D 25 HYDROXY: CPT

## 2024-05-08 ENCOUNTER — OFFICE VISIT (OUTPATIENT)
Dept: FAMILY MEDICINE CLINIC | Age: 44
End: 2024-05-08

## 2024-05-08 VITALS
OXYGEN SATURATION: 96 % | SYSTOLIC BLOOD PRESSURE: 114 MMHG | BODY MASS INDEX: 45.3 KG/M2 | TEMPERATURE: 97.1 F | WEIGHT: 288.6 LBS | HEIGHT: 67 IN | DIASTOLIC BLOOD PRESSURE: 66 MMHG | HEART RATE: 84 BPM

## 2024-05-08 DIAGNOSIS — M62.830 BACK MUSCLE SPASM: ICD-10-CM

## 2024-05-08 DIAGNOSIS — S39.012D STRAIN OF LUMBAR REGION, SUBSEQUENT ENCOUNTER: Primary | ICD-10-CM

## 2024-05-08 RX ORDER — CYCLOBENZAPRINE HCL 5 MG
5 TABLET ORAL NIGHTLY PRN
Qty: 30 TABLET | Refills: 0 | Status: SHIPPED | OUTPATIENT
Start: 2024-05-08

## 2024-05-08 RX ORDER — BACLOFEN 10 MG/1
TABLET ORAL
Qty: 30 TABLET | Refills: 0 | Status: SHIPPED | OUTPATIENT
Start: 2024-05-08

## 2024-05-08 RX ORDER — TRIAMCINOLONE ACETONIDE 40 MG/ML
40 INJECTION, SUSPENSION INTRA-ARTICULAR; INTRAMUSCULAR ONCE
Status: COMPLETED | OUTPATIENT
Start: 2024-05-08 | End: 2024-05-08

## 2024-05-08 RX ADMIN — TRIAMCINOLONE ACETONIDE 40 MG: 40 INJECTION, SUSPENSION INTRA-ARTICULAR; INTRAMUSCULAR at 15:14

## 2024-05-08 ASSESSMENT — ENCOUNTER SYMPTOMS
SHORTNESS OF BREATH: 0
BACK PAIN: 1

## 2024-05-08 NOTE — PROGRESS NOTES
Visit Information    Have you changed or started any medications since your last visit including any over-the-counter medicines, vitamins, or herbal medicines? no   Have you stopped taking any of your medications? Is so, why? -  no  Are you having any side effects from any of your medications? - no    Have you seen any other physician or provider since your last visit?  no   Have you had any other diagnostic tests since your last visit?  no   Have you been seen in the emergency room and/or had an admission in a hospital since we last saw you?  no   Have you had your routine dental cleaning in the past 6 months?  no     Do you have an active MyChart account? If no, what is the barrier?  Yes    Patient Care Team:  Erna Garcia APRN - CNP as PCP - General (Family Nurse Practitioner)  Erna Garcia APRN - CNP as PCP - Empaneled Provider    Medical History Review  Past Medical, Family, and Social History reviewed and  contribute to the patient presenting condition    Health Maintenance   Topic Date Due    Hepatitis B vaccine (1 of 3 - 3-dose series) Never done    Hepatitis C screen  Never done    DTaP/Tdap/Td vaccine (3 - Td or Tdap) 06/16/2023    COVID-19 Vaccine (2 - 2023-24 season) 09/01/2023    Cervical cancer screen  06/10/2026 (Originally 1/24/2022)    Flu vaccine (Season Ended) 08/01/2024    Depression Screen  02/02/2025    Lipids  11/12/2027    Hepatitis A vaccine  Aged Out    Hib vaccine  Aged Out    HPV vaccine  Aged Out    Polio vaccine  Aged Out    Meningococcal (ACWY) vaccine  Aged Out    Pneumococcal 0-64 years Vaccine  Aged Out    Diabetes screen  Discontinued    HIV screen  Discontinued       After obtaining consent, and per orders of Erna Garcia CNP, injection of Kenalog given in Right ventrogluteal by Anika Pittman MA. Patient instructed to remain in clinic for 20 minutes afterwards, and to report any adverse reaction to me immediately.

## 2024-05-08 NOTE — PROGRESS NOTES
[facility]  [address] [place] [address]  [phone]      Molly Dobson is a 43 y.o. female who presents today for her  medicalconditions/complaints as noted below.  Molly Dobson is c/o of Back Pain (Had injury 2-1/2 weeks ago when bending over and picking up flower pot, felt pain immediately, went to  [facility] 1 week later, gave her injection of anti-inflammatory, baclofen 5 mg, has another muscle relaxer but makes her drowsy and only takes at night.  Pain level has been 7 all week, maybe 5-6 today)    HPI:    HPI  Patient here today for evaluation of ongoing low back pain for the last 2-1/2 weeks.  States she had been lifting a heavy trailer and then had bent over to  a flower pot and felt severe immediate pain.  States 1 week after that she went to an urgent care and was given a Toradol injection, baclofen prescription and Flexeril but states she can only take this at night as it makes her very drowsy but it does work.  States pain has slowly been improving but still ranging 5-6 today.  She has been doing home exercises and heat patches with no major improvement.  She she did miss 2 days of work.      Please note that this chart was generated using voice recognition Dragon dictation software.  Although every effort was made to ensure the accuracy of this automated transcription, some errors in transcription may have occurred.    Past Medical History:   Diagnosis Date    Eczema     Hashimoto's disease     Hypotension     Hypothyroidism       Past Surgical History:   Procedure Laterality Date    ENDOMETRIAL ABLATION      TONSILLECTOMY      US BREAST BIOPSY W LOC DEVICE 1ST LESION LEFT Left 1/23/2023    US BREAST BIOPSY W LOC DEVICE 1ST LESION LEFT 1/23/2023 [facility]    US BREAST BIOPSY W LOC DEVICE 1ST LESION RIGHT Right 1/23/2023    US BREAST BIOPSY W LOC DEVICE 1ST LESION RIGHT 1/23/2023 [facility]    WISDOM TOOTH EXTRACTION       Family

## 2024-05-09 RX ORDER — VALACYCLOVIR HYDROCHLORIDE 1 G/1
TABLET, FILM COATED ORAL
Qty: 180 TABLET | OUTPATIENT
Start: 2024-05-09

## 2025-02-17 ENCOUNTER — PATIENT MESSAGE (OUTPATIENT)
Dept: FAMILY MEDICINE CLINIC | Age: 45
End: 2025-02-17

## 2025-02-17 DIAGNOSIS — Z12.31 ENCOUNTER FOR SCREENING MAMMOGRAM FOR MALIGNANT NEOPLASM OF BREAST: Primary | ICD-10-CM

## 2025-02-24 ENCOUNTER — PATIENT MESSAGE (OUTPATIENT)
Dept: FAMILY MEDICINE CLINIC | Age: 45
End: 2025-02-24

## 2025-03-04 ENCOUNTER — TELEPHONE (OUTPATIENT)
Dept: FAMILY MEDICINE CLINIC | Age: 45
End: 2025-03-04

## 2025-03-04 DIAGNOSIS — N64.4 BREAST PAIN, RIGHT: Primary | ICD-10-CM

## 2025-03-04 NOTE — TELEPHONE ENCOUNTER
[facility] called requesting mammogram order be faxed to an alternative number due to them not receiving the other attempts , fax# [phone]. Writer faxed!

## 2025-03-04 NOTE — TELEPHONE ENCOUNTER
Anika called following additional for mammogram diag w or wo CAD bilateral that was first ordered.  NEEDS [facility].  [facility].  Fax: [phone]

## 2025-03-17 ENCOUNTER — HOSPITAL ENCOUNTER (OUTPATIENT)
Age: 45
Discharge: HOME | End: 2025-03-17
Payer: COMMERCIAL

## 2025-03-17 LAB
T3FREE SERPL-MCNC: 3.74 PG/ML (ref 2–4.4)
T4 FREE SERPL-MCNC: 1.1 NG/DL (ref 0.92–1.68)
TSH SERPL DL<=0.05 MIU/L-ACNC: 2.24 UIU/ML (ref 0.27–4.2)

## 2025-03-17 PROCEDURE — 84481 FREE ASSAY (FT-3): CPT

## 2025-03-17 PROCEDURE — 36415 COLL VENOUS BLD VENIPUNCTURE: CPT

## 2025-03-17 PROCEDURE — 84443 ASSAY THYROID STIM HORMONE: CPT

## 2025-03-17 PROCEDURE — 84439 ASSAY OF FREE THYROXINE: CPT

## 2025-03-18 DIAGNOSIS — N64.4 BREAST PAIN, RIGHT: ICD-10-CM

## 2025-04-17 ENCOUNTER — PATIENT MESSAGE (OUTPATIENT)
Dept: FAMILY MEDICINE CLINIC | Age: 45
End: 2025-04-17

## 2025-04-17 ENCOUNTER — OFFICE VISIT (OUTPATIENT)
Dept: FAMILY MEDICINE CLINIC | Age: 45
End: 2025-04-17
Payer: COMMERCIAL

## 2025-04-17 VITALS
HEIGHT: 67 IN | WEIGHT: 293 LBS | TEMPERATURE: 97.5 F | BODY MASS INDEX: 45.99 KG/M2 | OXYGEN SATURATION: 97 % | HEART RATE: 96 BPM

## 2025-04-17 DIAGNOSIS — B00.1 COLD SORE: ICD-10-CM

## 2025-04-17 DIAGNOSIS — Z91.89 AT HIGH RISK FOR BREAST CANCER: ICD-10-CM

## 2025-04-17 DIAGNOSIS — Z12.11 SCREEN FOR COLON CANCER: ICD-10-CM

## 2025-04-17 DIAGNOSIS — L50.8 ACUTE URTICARIA: Primary | ICD-10-CM

## 2025-04-17 PROCEDURE — 1036F TOBACCO NON-USER: CPT | Performed by: NURSE PRACTITIONER

## 2025-04-17 PROCEDURE — G8417 CALC BMI ABV UP PARAM F/U: HCPCS | Performed by: NURSE PRACTITIONER

## 2025-04-17 PROCEDURE — 99214 OFFICE O/P EST MOD 30 MIN: CPT | Performed by: NURSE PRACTITIONER

## 2025-04-17 PROCEDURE — G8427 DOCREV CUR MEDS BY ELIG CLIN: HCPCS | Performed by: NURSE PRACTITIONER

## 2025-04-17 RX ORDER — HYDROCORTISONE 25 MG/G
CREAM TOPICAL
Qty: 30 G | Refills: 2 | Status: SHIPPED | OUTPATIENT
Start: 2025-04-17

## 2025-04-17 RX ORDER — HYDROCORTISONE 25 MG/G
CREAM TOPICAL
COMMUNITY
Start: 2025-03-27 | End: 2025-04-17 | Stop reason: SDUPTHER

## 2025-04-17 RX ORDER — VALACYCLOVIR HYDROCHLORIDE 1 G/1
1000 TABLET, FILM COATED ORAL 2 TIMES DAILY
Qty: 4 TABLET | Refills: 5 | Status: SHIPPED | OUTPATIENT
Start: 2025-04-17

## 2025-04-17 RX ORDER — LEVOTHYROXINE SODIUM 112 UG/1
112 TABLET ORAL DAILY
COMMUNITY

## 2025-04-17 SDOH — ECONOMIC STABILITY: FOOD INSECURITY: WITHIN THE PAST 12 MONTHS, YOU WORRIED THAT YOUR FOOD WOULD RUN OUT BEFORE YOU GOT MONEY TO BUY MORE.: NEVER TRUE

## 2025-04-17 SDOH — ECONOMIC STABILITY: FOOD INSECURITY: WITHIN THE PAST 12 MONTHS, THE FOOD YOU BOUGHT JUST DIDN'T LAST AND YOU DIDN'T HAVE MONEY TO GET MORE.: NEVER TRUE

## 2025-04-17 ASSESSMENT — PATIENT HEALTH QUESTIONNAIRE - PHQ9
SUM OF ALL RESPONSES TO PHQ QUESTIONS 1-9: 0
2. FEELING DOWN, DEPRESSED OR HOPELESS: NOT AT ALL
1. LITTLE INTEREST OR PLEASURE IN DOING THINGS: NOT AT ALL
SUM OF ALL RESPONSES TO PHQ QUESTIONS 1-9: 0

## 2025-04-17 ASSESSMENT — ENCOUNTER SYMPTOMS
WHEEZING: 0
VOMITING: 0
BACK PAIN: 1
CHEST TIGHTNESS: 0
CONSTIPATION: 0
ABDOMINAL PAIN: 0
COUGH: 0
ABDOMINAL DISTENTION: 0
NAUSEA: 0
BLOOD IN STOOL: 0
SHORTNESS OF BREATH: 0
DIARRHEA: 0

## 2025-04-17 NOTE — PROGRESS NOTES
Diabetes screen  Discontinued    HIV screen  Discontinued       Subjective:      Review of Systems   Constitutional:  Positive for diaphoresis (night sweats 2-3 weeks). Negative for chills, fatigue and fever.   Respiratory:  Negative for cough, chest tightness, shortness of breath and wheezing.    Cardiovascular:  Negative for chest pain, palpitations and leg swelling.   Gastrointestinal:  Negative for abdominal distention, abdominal pain, blood in stool, constipation, diarrhea, nausea and vomiting.   Musculoskeletal:  Positive for back pain (chronic due to injury). Negative for arthralgias, joint swelling and myalgias.   Skin:  Positive for rash.   Allergic/Immunologic: Positive for environmental allergies and food allergies.   Neurological:  Negative for dizziness, light-headedness, numbness and headaches.   Hematological:  Negative for adenopathy. Does not bruise/bleed easily.         :     Physical Exam  Vitals and nursing note reviewed.   Constitutional:       Appearance: Normal appearance. She is obese. She is not ill-appearing or diaphoretic.   HENT:      Head: Normocephalic and atraumatic.   Eyes:      Conjunctiva/sclera: Conjunctivae normal.   Cardiovascular:      Rate and Rhythm: Normal rate and regular rhythm.      Pulses: Normal pulses.      Heart sounds: Normal heart sounds.   Pulmonary:      Effort: Pulmonary effort is normal.      Breath sounds: Normal breath sounds.   Abdominal:      General: Abdomen is flat. Bowel sounds are normal.      Palpations: Abdomen is soft.   Musculoskeletal:         General: Normal range of motion.      Cervical back: Normal range of motion and neck supple.   Skin:     General: Skin is warm and dry.      Findings: Rash (wheels around areolas of bilateral breats. palpable pinpoint red lesions on arms, hands, and lower legs sparing the palms.) present.      Comments: Cold sore left side upper lip   Neurological:      General: No focal deficit present.      Mental Status: She

## 2025-04-18 ENCOUNTER — HOSPITAL ENCOUNTER (OUTPATIENT)
Age: 45
Discharge: HOME | End: 2025-04-18
Payer: COMMERCIAL

## 2025-04-18 DIAGNOSIS — L50.8 ACUTE URTICARIA: ICD-10-CM

## 2025-04-18 LAB
BASOPHILS # BLD: 0.03 K/UL (ref 0–0.2)
BASOPHILS NFR BLD: 0 % (ref 0–2)
C3 SERPL-MCNC: 159 MG/DL (ref 90–180)
C4 SERPL-MCNC: 52 MG/DL (ref 10–40)
EOSINOPHIL # BLD: 0.15 K/UL (ref 0–0.44)
EOSINOPHILS RELATIVE PERCENT: 2 % (ref 1–4)
ERYTHROCYTE [DISTWIDTH] IN BLOOD BY AUTOMATED COUNT: 15.1 % (ref 11.8–14.4)
HCT VFR BLD AUTO: 42.5 % (ref 36.3–47.1)
HGB BLD-MCNC: 14 G/DL (ref 11.9–15.1)
IMM GRANULOCYTES # BLD AUTO: <0.03 K/UL (ref 0–0.3)
IMM GRANULOCYTES NFR BLD: 0 %
LYMPHOCYTES NFR BLD: 1.45 K/UL (ref 1.1–3.7)
LYMPHOCYTES RELATIVE PERCENT: 21 % (ref 24–43)
MCH RBC QN AUTO: 26.7 PG (ref 25.2–33.5)
MCHC RBC AUTO-ENTMCNC: 32.9 G/DL (ref 28.4–34.8)
MCV RBC AUTO: 81.1 FL (ref 82.6–102.9)
MONOCYTES NFR BLD: 0.47 K/UL (ref 0.1–1.2)
MONOCYTES NFR BLD: 7 % (ref 3–12)
NEUTROPHILS NFR BLD: 70 % (ref 36–65)
NEUTS SEG NFR BLD: 4.74 K/UL (ref 1.5–8.1)
NRBC BLD-RTO: 0 PER 100 WBC
PLATELET # BLD AUTO: 194 K/UL (ref 138–453)
PMV BLD AUTO: 9.5 FL (ref 8.1–13.5)
RBC # BLD AUTO: 5.24 M/UL (ref 3.95–5.11)
RBC # BLD: ABNORMAL 10*6/UL
WBC OTHER # BLD: 6.9 K/UL (ref 3.5–11.3)

## 2025-04-18 PROCEDURE — 36415 COLL VENOUS BLD VENIPUNCTURE: CPT

## 2025-04-18 PROCEDURE — 86038 ANTINUCLEAR ANTIBODIES: CPT

## 2025-04-18 PROCEDURE — 86225 DNA ANTIBODY NATIVE: CPT

## 2025-04-18 PROCEDURE — 86160 COMPLEMENT ANTIGEN: CPT

## 2025-04-18 PROCEDURE — 85025 COMPLETE CBC W/AUTO DIFF WBC: CPT

## 2025-04-18 PROCEDURE — 83516 IMMUNOASSAY NONANTIBODY: CPT

## 2025-04-18 RX ORDER — LORATADINE 10 MG/1
10 TABLET ORAL 2 TIMES DAILY
Qty: 60 TABLET | Refills: 0 | Status: SHIPPED | OUTPATIENT
Start: 2025-04-18 | End: 2025-05-18

## 2025-04-21 ENCOUNTER — RESULTS FOLLOW-UP (OUTPATIENT)
Dept: FAMILY MEDICINE CLINIC | Age: 45
End: 2025-04-21

## 2025-04-21 LAB
ANA SER QL IA: NEGATIVE
ANCA MYELOPEROXIDASE: <0.3 AU/ML (ref 0–3.5)
ANCA PROTEINASE 3: <0.7 AU/ML (ref 0–2)
DSDNA IGG SER QL IA: <0.5 IU/ML
NUCLEAR IGG SER IA-RTO: 0.2 U/ML

## 2025-05-06 ENCOUNTER — OFFICE VISIT (OUTPATIENT)
Age: 45
End: 2025-05-06

## 2025-05-06 VITALS
HEIGHT: 67 IN | HEART RATE: 84 BPM | BODY MASS INDEX: 44.73 KG/M2 | WEIGHT: 285 LBS | TEMPERATURE: 97.8 F | OXYGEN SATURATION: 96 % | RESPIRATION RATE: 18 BRPM

## 2025-05-06 DIAGNOSIS — J40 BRONCHITIS: Primary | ICD-10-CM

## 2025-05-06 RX ORDER — AZITHROMYCIN 250 MG/1
TABLET, FILM COATED ORAL
Qty: 6 TABLET | Refills: 0 | Status: SHIPPED | OUTPATIENT
Start: 2025-05-06 | End: 2025-05-16

## 2025-05-06 RX ORDER — BENZONATATE 100 MG/1
100 CAPSULE ORAL 2 TIMES DAILY PRN
Qty: 14 CAPSULE | Refills: 0 | Status: SHIPPED | OUTPATIENT
Start: 2025-05-06 | End: 2025-05-13

## 2025-05-06 ASSESSMENT — ENCOUNTER SYMPTOMS
COUGH: 1
RHINORRHEA: 1
SHORTNESS OF BREATH: 0

## 2025-05-06 NOTE — PROGRESS NOTES
[facility]  [address] [place] [id number]  Phone: [phone]  Fax: [phone]      Molly Dobson  1980  MRN: [id number]  Date of visit: 2025    Chief Complaint:     Molly Dobson (:  1980) is a 44 y.o. female,New patient, here for evaluation of the following chief complaint(s):  Congestion and Cough      Allergies   Allergen Reactions    Amoxicillin-Pot Clavulanate Nausea Only        Current Outpatient Medications   Medication Sig Dispense Refill    azithromycin (ZITHROMAX) 250 MG tablet 500mg on day 1 followed by 250mg on days 2 - 5 6 tablet 0    benzonatate (TESSALON) 100 MG capsule Take 1 capsule by mouth 2 times daily as needed for Cough 14 capsule 0    levothyroxine (SYNTHROID) 112 MCG tablet Take 1 tablet by mouth Daily      liothyronine (CYTOMEL) 5 MCG tablet TAKE 1 TABLET BY MOUTH DAILY      loratadine (CLARITIN) 10 MG tablet Take 1 tablet by mouth in the morning and at bedtime 60 tablet 0    hydrocortisone 2.5 % cream Apply topically 2 times daily. 30 g 2    valACYclovir (VALTREX) 1 g tablet Take 1 tablet by mouth 2 times daily 4 tablet 5    baclofen (LIORESAL) 10 MG tablet Take 1-2 tabs every 8 hours prn for muscle spasm 30 tablet 0    cyclobenzaprine (FLEXERIL) 5 MG tablet Take 1 tablet by mouth nightly as needed for Muscle spasms 30 tablet 0    SYNTHROID 112 MCG tablet       Cholecalciferol (VITAMIN D) 50 MCG ( UT) CAPS capsule Take by mouth      metroNIDAZOLE (METROGEL) 1 % gel Apply topically daily. 60 g 1     No current facility-administered medications for this visit.        Past Medical History:   Diagnosis Date    Eczema     Hashimoto's disease     Hypotension     Hypothyroidism           Subjective/Objective:       History provided by:  Patient  Cough  This is a new problem. The current episode started 1 to 4 weeks ago. The problem has been gradually worsening. The cough is Productive of sputum. Associated symptoms include

## 2025-07-01 ENCOUNTER — OFFICE VISIT (OUTPATIENT)
Age: 45
End: 2025-07-01

## 2025-07-01 ENCOUNTER — HOSPITAL ENCOUNTER (OUTPATIENT)
Dept: GENERAL RADIOLOGY | Age: 45
Discharge: HOME | End: 2025-07-03
Payer: COMMERCIAL

## 2025-07-01 VITALS
OXYGEN SATURATION: 95 % | DIASTOLIC BLOOD PRESSURE: 78 MMHG | BODY MASS INDEX: 43.95 KG/M2 | HEART RATE: 79 BPM | SYSTOLIC BLOOD PRESSURE: 128 MMHG | WEIGHT: 280 LBS | HEIGHT: 67 IN | TEMPERATURE: 97.9 F | RESPIRATION RATE: 18 BRPM

## 2025-07-01 DIAGNOSIS — M79.671 RIGHT FOOT PAIN: ICD-10-CM

## 2025-07-01 DIAGNOSIS — M79.671 RIGHT FOOT PAIN: Primary | ICD-10-CM

## 2025-07-01 PROBLEM — M76.71 PERONEAL TENDINITIS OF RIGHT LOWER EXTREMITY: Status: ACTIVE | Noted: 2024-03-15

## 2025-07-01 PROBLEM — M72.2 PLANTAR FASCIITIS: Status: ACTIVE | Noted: 2024-03-15

## 2025-07-01 PROBLEM — M79.673 FOOT PAIN: Status: ACTIVE | Noted: 2024-02-23

## 2025-07-01 PROBLEM — M21.6X2 OTHER ACQUIRED DEFORMITIES OF LEFT FOOT: Status: ACTIVE | Noted: 2024-03-15

## 2025-07-01 PROBLEM — Q66.222 METATARSUS ADDUCTUS OF BOTH FEET: Status: ACTIVE | Noted: 2025-07-01

## 2025-07-01 PROBLEM — Q66.221 METATARSUS ADDUCTUS OF BOTH FEET: Status: ACTIVE | Noted: 2025-07-01

## 2025-07-01 PROBLEM — M21.6X9 PRONATION DEFORMITY OF FOOT: Status: ACTIVE | Noted: 2024-03-15

## 2025-07-01 PROBLEM — N64.4 MASTODYNIA: Status: ACTIVE | Noted: 2025-03-17

## 2025-07-01 PROCEDURE — 73630 X-RAY EXAM OF FOOT: CPT

## 2025-07-01 ASSESSMENT — ENCOUNTER SYMPTOMS
RESPIRATORY NEGATIVE: 1
ALLERGIC/IMMUNOLOGIC NEGATIVE: 1
EYES NEGATIVE: 1
GASTROINTESTINAL NEGATIVE: 1

## 2025-07-01 NOTE — PATIENT INSTRUCTIONS
Rest, Ice, Compression, and Elevation of the injured area; taking over-the-counter pain medication as needed; avoiding activities that cause pain; gradually returning to normal activity as tolerated.     Follow up with PCP for persistent symptoms with possible need for advanced imaging and/or physical therapy or referral to Ortho.     When to return to clinic or go to ER:   Severe pain or swelling that doesn't improve with home care   Inability to bear weight on the injured area   Visible deformity or open wounds   Numbness, tingling, or weakness in the affected area   Signs of infection, like redness, warmth, or pus

## 2025-07-01 NOTE — PROGRESS NOTES
comments: Difficulty bearing weight-very painful. The symptoms are aggravated by activity and standing. She has tried nothing for the symptoms. previous hairline fracture of right foot       Patient denies injury but patient stated that she has been hiking, walking and that she had a previous hairline fracture in the same foot 6 months ago.      Vitals:    07/01/25 1709   BP: 128/78   BP Site: Left Upper Arm   Patient Position: Sitting   BP Cuff Size: Large Adult   Pulse: 79   Resp: 18   Temp: 97.9 °F (36.6 °C)   TempSrc: Oral   SpO2: 95%   Weight: 127 kg (280 lb)   Height: 1.702 m (5' 7\")       Review of Systems   Constitutional: Negative.  Negative for fever.   HENT: Negative.     Eyes: Negative.    Respiratory: Negative.     Cardiovascular: Negative.    Gastrointestinal: Negative.    Endocrine: Negative.    Genitourinary: Negative.    Musculoskeletal: Negative.  Negative for stiffness.   Skin: Negative.    Allergic/Immunologic: Negative.    Neurological: Negative.  Negative for tingling and numbness.   Hematological: Negative.    Psychiatric/Behavioral: Negative.         Physical Exam  Vitals and nursing note reviewed.   Constitutional:       Appearance: Normal appearance.   Eyes:      Pupils: Pupils are equal, round, and reactive to light.   Cardiovascular:      Rate and Rhythm: Normal rate and regular rhythm.      Pulses: Normal pulses.      Heart sounds: Normal heart sounds.   Pulmonary:      Effort: Pulmonary effort is normal.      Breath sounds: Normal breath sounds.   Musculoskeletal:         General: Swelling and tenderness present.      Right foot: Decreased range of motion. Swelling, tenderness and bony tenderness present.   Skin:     General: Skin is warm and dry.      Capillary Refill: Capillary refill takes less than 2 seconds.   Neurological:      General: No focal deficit present.      Mental Status: She is alert and oriented to person, place, and time.   Psychiatric:         Mood and Affect: Mood